# Patient Record
Sex: FEMALE | Race: OTHER | HISPANIC OR LATINO
[De-identification: names, ages, dates, MRNs, and addresses within clinical notes are randomized per-mention and may not be internally consistent; named-entity substitution may affect disease eponyms.]

---

## 2017-02-21 PROBLEM — Z00.00 ENCOUNTER FOR PREVENTIVE HEALTH EXAMINATION: Status: ACTIVE | Noted: 2017-02-21

## 2017-02-24 ENCOUNTER — OTHER (OUTPATIENT)
Age: 27
End: 2017-02-24

## 2017-02-28 ENCOUNTER — ASOB RESULT (OUTPATIENT)
Age: 27
End: 2017-02-28

## 2017-02-28 ENCOUNTER — APPOINTMENT (OUTPATIENT)
Dept: MATERNAL FETAL MEDICINE | Facility: CLINIC | Age: 27
End: 2017-02-28

## 2017-02-28 ENCOUNTER — APPOINTMENT (OUTPATIENT)
Dept: ANTEPARTUM | Facility: CLINIC | Age: 27
End: 2017-02-28

## 2017-02-28 VITALS
BODY MASS INDEX: 29.19 KG/M2 | HEIGHT: 64 IN | WEIGHT: 171 LBS | SYSTOLIC BLOOD PRESSURE: 120 MMHG | DIASTOLIC BLOOD PRESSURE: 70 MMHG

## 2017-03-13 LAB
T3FREE SERPL-MCNC: 2.9 PG/ML
T4 FREE SERPL-MCNC: 1 NG/DL
TSH SERPL-ACNC: 0.6 UIU/ML

## 2017-04-05 LAB
PERFORMED BY: NORMAL
ZIKA VIRUS PANEL RESULT: ABNORMAL

## 2017-04-12 ENCOUNTER — ASOB RESULT (OUTPATIENT)
Age: 27
End: 2017-04-12

## 2017-04-12 ENCOUNTER — APPOINTMENT (OUTPATIENT)
Dept: ANTEPARTUM | Facility: CLINIC | Age: 27
End: 2017-04-12

## 2017-04-12 ENCOUNTER — APPOINTMENT (OUTPATIENT)
Dept: MATERNAL FETAL MEDICINE | Facility: CLINIC | Age: 27
End: 2017-04-12

## 2017-04-12 VITALS
DIASTOLIC BLOOD PRESSURE: 68 MMHG | WEIGHT: 176 LBS | BODY MASS INDEX: 30.05 KG/M2 | SYSTOLIC BLOOD PRESSURE: 106 MMHG | HEIGHT: 64 IN

## 2017-04-12 VITALS — RESPIRATION RATE: 18 BRPM

## 2017-05-11 ENCOUNTER — ASOB RESULT (OUTPATIENT)
Age: 27
End: 2017-05-11

## 2017-05-11 ENCOUNTER — APPOINTMENT (OUTPATIENT)
Dept: ANTEPARTUM | Facility: CLINIC | Age: 27
End: 2017-05-11

## 2017-05-31 ENCOUNTER — APPOINTMENT (OUTPATIENT)
Dept: ANTEPARTUM | Facility: CLINIC | Age: 27
End: 2017-05-31

## 2017-06-11 ENCOUNTER — INPATIENT (INPATIENT)
Facility: HOSPITAL | Age: 27
LOS: 2 days | Discharge: HOME | End: 2017-06-14
Attending: SPECIALIST | Admitting: SPECIALIST
Payer: MEDICAID

## 2017-06-11 VITALS — WEIGHT: 194.01 LBS | HEIGHT: 64 IN

## 2017-06-11 DIAGNOSIS — O47.1 FALSE LABOR AT OR AFTER 37 COMPLETED WEEKS OF GESTATION: ICD-10-CM

## 2017-06-11 LAB
ABO RH CONFIRMATION: SIGNIFICANT CHANGE UP
APPEARANCE UR: CLEAR — SIGNIFICANT CHANGE UP
BACTERIA # UR AUTO: ABNORMAL
BASOPHILS # BLD AUTO: 0 K/UL — SIGNIFICANT CHANGE UP (ref 0–0.2)
BASOPHILS NFR BLD AUTO: 0.1 % — SIGNIFICANT CHANGE UP (ref 0–2)
BILIRUB UR-MCNC: NEGATIVE — SIGNIFICANT CHANGE UP
BLD GP AB SCN SERPL QL: SIGNIFICANT CHANGE UP
COLOR SPEC: YELLOW — SIGNIFICANT CHANGE UP
DIFF PNL FLD: ABNORMAL
EOSINOPHIL # BLD AUTO: 0 K/UL — SIGNIFICANT CHANGE UP (ref 0–0.5)
EOSINOPHIL NFR BLD AUTO: 0.3 % — SIGNIFICANT CHANGE UP (ref 0–6)
EPI CELLS # UR: SIGNIFICANT CHANGE UP
GLUCOSE UR QL: NEGATIVE MG/DL — SIGNIFICANT CHANGE UP
HCT VFR BLD CALC: 35.3 % — LOW (ref 37–47)
HGB BLD-MCNC: 11.4 G/DL — LOW (ref 12–16)
KETONES UR-MCNC: NEGATIVE — SIGNIFICANT CHANGE UP
LEUKOCYTE ESTERASE UR-ACNC: NEGATIVE — SIGNIFICANT CHANGE UP
LYMPHOCYTES # BLD AUTO: 17.7 % — LOW (ref 20–55)
LYMPHOCYTES # BLD AUTO: 2.7 K/UL — SIGNIFICANT CHANGE UP (ref 1–4.8)
MCHC RBC-ENTMCNC: 27.8 PG — SIGNIFICANT CHANGE UP (ref 27–31)
MCHC RBC-ENTMCNC: 32.3 G/DL — SIGNIFICANT CHANGE UP (ref 32–36)
MCV RBC AUTO: 86.1 FL — SIGNIFICANT CHANGE UP (ref 81–99)
MONOCYTES # BLD AUTO: 1 K/UL — HIGH (ref 0–0.8)
MONOCYTES NFR BLD AUTO: 6.8 % — SIGNIFICANT CHANGE UP (ref 3–10)
NEUTROPHILS # BLD AUTO: 11.2 K/UL — HIGH (ref 1.8–8)
NEUTROPHILS NFR BLD AUTO: 74.4 % — HIGH (ref 37–73)
NITRITE UR-MCNC: NEGATIVE — SIGNIFICANT CHANGE UP
PH UR: 6 — SIGNIFICANT CHANGE UP (ref 5–8)
PLATELET # BLD AUTO: 402 K/UL — HIGH (ref 150–400)
PROT UR-MCNC: 30 MG/DL
RBC # BLD: 4.1 M/UL — LOW (ref 4.4–5.2)
RBC # FLD: 14.8 % — SIGNIFICANT CHANGE UP (ref 11–15.6)
RBC CASTS # UR COMP ASSIST: SIGNIFICANT CHANGE UP /HPF (ref 0–4)
SP GR SPEC: 1.01 — SIGNIFICANT CHANGE UP (ref 1.01–1.02)
TYPE + AB SCN PNL BLD: SIGNIFICANT CHANGE UP
UROBILINOGEN FLD QL: NEGATIVE MG/DL — SIGNIFICANT CHANGE UP
WBC # BLD: 15 K/UL — HIGH (ref 4.8–10.8)
WBC # FLD AUTO: 15 K/UL — HIGH (ref 4.8–10.8)
WBC UR QL: NEGATIVE — SIGNIFICANT CHANGE UP

## 2017-06-11 RX ORDER — CITRIC ACID/SODIUM CITRATE 300-500 MG
30 SOLUTION, ORAL ORAL ONCE
Qty: 0 | Refills: 0 | Status: COMPLETED | OUTPATIENT
Start: 2017-06-11 | End: 2017-06-11

## 2017-06-11 RX ORDER — SODIUM CHLORIDE 9 MG/ML
1000 INJECTION, SOLUTION INTRAVENOUS ONCE
Qty: 0 | Refills: 0 | Status: DISCONTINUED | OUTPATIENT
Start: 2017-06-11 | End: 2017-06-12

## 2017-06-11 RX ORDER — PENICILLIN G POTASSIUM 5000000 [IU]/1
2.5 POWDER, FOR SOLUTION INTRAMUSCULAR; INTRAPLEURAL; INTRATHECAL; INTRAVENOUS EVERY 4 HOURS
Qty: 0 | Refills: 0 | Status: COMPLETED | OUTPATIENT
Start: 2017-06-11 | End: 2017-06-11

## 2017-06-11 RX ORDER — SODIUM CHLORIDE 9 MG/ML
500 INJECTION, SOLUTION INTRAVENOUS ONCE
Qty: 0 | Refills: 0 | Status: COMPLETED | OUTPATIENT
Start: 2017-06-11 | End: 2017-06-11

## 2017-06-11 RX ORDER — SODIUM CHLORIDE 9 MG/ML
1000 INJECTION, SOLUTION INTRAVENOUS
Qty: 0 | Refills: 0 | Status: DISCONTINUED | OUTPATIENT
Start: 2017-06-11 | End: 2017-06-12

## 2017-06-11 RX ORDER — SODIUM CHLORIDE 9 MG/ML
1000 INJECTION, SOLUTION INTRAVENOUS ONCE
Qty: 0 | Refills: 0 | Status: COMPLETED | OUTPATIENT
Start: 2017-06-11 | End: 2017-06-11

## 2017-06-11 RX ORDER — PENICILLIN G POTASSIUM 5000000 [IU]/1
5 POWDER, FOR SOLUTION INTRAMUSCULAR; INTRAPLEURAL; INTRATHECAL; INTRAVENOUS ONCE
Qty: 0 | Refills: 0 | Status: COMPLETED | OUTPATIENT
Start: 2017-06-11 | End: 2017-06-11

## 2017-06-11 RX ORDER — SODIUM CHLORIDE 9 MG/ML
1000 INJECTION, SOLUTION INTRAVENOUS
Qty: 0 | Refills: 0 | Status: DISCONTINUED | OUTPATIENT
Start: 2017-06-11 | End: 2017-06-14

## 2017-06-11 RX ORDER — CITRIC ACID/SODIUM CITRATE 300-500 MG
15 SOLUTION, ORAL ORAL ONCE
Qty: 0 | Refills: 0 | Status: DISCONTINUED | OUTPATIENT
Start: 2017-06-11 | End: 2017-06-12

## 2017-06-11 RX ORDER — OXYTOCIN 10 UNIT/ML
2 VIAL (ML) INJECTION
Qty: 30 | Refills: 0 | Status: DISCONTINUED | OUTPATIENT
Start: 2017-06-11 | End: 2017-06-14

## 2017-06-11 RX ORDER — OXYTOCIN 10 UNIT/ML
333.33 VIAL (ML) INJECTION
Qty: 20 | Refills: 0 | Status: COMPLETED | OUTPATIENT
Start: 2017-06-11

## 2017-06-11 RX ORDER — OXYTOCIN 10 UNIT/ML
333.33 VIAL (ML) INJECTION
Qty: 20 | Refills: 0 | Status: DISCONTINUED | OUTPATIENT
Start: 2017-06-11 | End: 2017-06-12

## 2017-06-11 RX ADMIN — SODIUM CHLORIDE 2000 MILLILITER(S): 9 INJECTION, SOLUTION INTRAVENOUS at 21:12

## 2017-06-11 RX ADMIN — Medication 30 MILLILITER(S): at 21:12

## 2017-06-11 RX ADMIN — Medication 2 MILLIUNIT(S)/MIN: at 23:44

## 2017-06-11 RX ADMIN — SODIUM CHLORIDE 1000 MILLILITER(S): 9 INJECTION, SOLUTION INTRAVENOUS at 18:12

## 2017-06-11 RX ADMIN — SODIUM CHLORIDE 125 MILLILITER(S): 9 INJECTION, SOLUTION INTRAVENOUS at 18:35

## 2017-06-11 RX ADMIN — PENICILLIN G POTASSIUM 100 MILLION UNIT(S): 5000000 POWDER, FOR SOLUTION INTRAMUSCULAR; INTRAPLEURAL; INTRATHECAL; INTRAVENOUS at 22:40

## 2017-06-11 RX ADMIN — PENICILLIN G POTASSIUM 100 MILLION UNIT(S): 5000000 POWDER, FOR SOLUTION INTRAMUSCULAR; INTRAPLEURAL; INTRATHECAL; INTRAVENOUS at 18:39

## 2017-06-12 LAB
BASE EXCESS BLDCOA CALC-SCNC: -3.6 MMOL/L — SIGNIFICANT CHANGE UP (ref -11.6–0.4)
BASE EXCESS BLDCOV CALC-SCNC: -2.5 MMOL/L — SIGNIFICANT CHANGE UP (ref -9.3–0.3)
GAS PNL BLDCOV: 7.3 — SIGNIFICANT CHANGE UP (ref 7.11–7.36)
HCO3 BLDCOA-SCNC: 24 MMOL/L — SIGNIFICANT CHANGE UP (ref 15–27)
HCO3 BLDCOV-SCNC: 24 MMOL/L — SIGNIFICANT CHANGE UP (ref 17–25)
PCO2 BLDCOA: 54.2 MMHG — SIGNIFICANT CHANGE UP (ref 32.2–65.8)
PCO2 BLDCOV: 49.8 MMHG — HIGH (ref 27–49.4)
PH BLDCOA: 7.26 — SIGNIFICANT CHANGE UP (ref 7.11–7.36)
PO2 BLDCOA: 24 MMHG — SIGNIFICANT CHANGE UP (ref 17.4–41)
PO2 BLDCOA: 24 MMHG — SIGNIFICANT CHANGE UP (ref 6–30)
RPR SERPL-ACNC: SIGNIFICANT CHANGE UP
SAO2 % BLDCOA: SIGNIFICANT CHANGE UP
SAO2 % BLDCOV: SIGNIFICANT CHANGE UP

## 2017-06-12 RX ORDER — TETANUS TOXOID, REDUCED DIPHTHERIA TOXOID AND ACELLULAR PERTUSSIS VACCINE, ADSORBED 5; 2.5; 8; 8; 2.5 [IU]/.5ML; [IU]/.5ML; UG/.5ML; UG/.5ML; UG/.5ML
0.5 SUSPENSION INTRAMUSCULAR ONCE
Qty: 0 | Refills: 0 | Status: DISCONTINUED | OUTPATIENT
Start: 2017-06-12 | End: 2017-06-14

## 2017-06-12 RX ORDER — OXYTOCIN 10 UNIT/ML
333.33 VIAL (ML) INJECTION
Qty: 20 | Refills: 0 | Status: DISCONTINUED | OUTPATIENT
Start: 2017-06-12 | End: 2017-06-14

## 2017-06-12 RX ORDER — AER TRAVELER 0.5 G/1
1 SOLUTION RECTAL; TOPICAL EVERY 4 HOURS
Qty: 0 | Refills: 0 | Status: DISCONTINUED | OUTPATIENT
Start: 2017-06-12 | End: 2017-06-14

## 2017-06-12 RX ORDER — DIBUCAINE 1 %
1 OINTMENT (GRAM) RECTAL EVERY 4 HOURS
Qty: 0 | Refills: 0 | Status: DISCONTINUED | OUTPATIENT
Start: 2017-06-12 | End: 2017-06-14

## 2017-06-12 RX ORDER — HYDROCORTISONE 1 %
1 OINTMENT (GRAM) TOPICAL EVERY 4 HOURS
Qty: 0 | Refills: 0 | Status: DISCONTINUED | OUTPATIENT
Start: 2017-06-12 | End: 2017-06-14

## 2017-06-12 RX ORDER — AER TRAVELER 0.5 G/1
1 SOLUTION RECTAL; TOPICAL EVERY 4 HOURS
Qty: 0 | Refills: 0 | Status: DISCONTINUED | OUTPATIENT
Start: 2017-06-12 | End: 2017-06-12

## 2017-06-12 RX ORDER — DOCUSATE SODIUM 100 MG
100 CAPSULE ORAL
Qty: 0 | Refills: 0 | Status: DISCONTINUED | OUTPATIENT
Start: 2017-06-12 | End: 2017-06-14

## 2017-06-12 RX ORDER — MAGNESIUM HYDROXIDE 400 MG/1
30 TABLET, CHEWABLE ORAL
Qty: 0 | Refills: 0 | Status: DISCONTINUED | OUTPATIENT
Start: 2017-06-12 | End: 2017-06-14

## 2017-06-12 RX ORDER — ACETAMINOPHEN 500 MG
650 TABLET ORAL EVERY 6 HOURS
Qty: 0 | Refills: 0 | Status: DISCONTINUED | OUTPATIENT
Start: 2017-06-12 | End: 2017-06-14

## 2017-06-12 RX ORDER — IBUPROFEN 200 MG
600 TABLET ORAL EVERY 6 HOURS
Qty: 0 | Refills: 0 | Status: DISCONTINUED | OUTPATIENT
Start: 2017-06-12 | End: 2017-06-14

## 2017-06-12 RX ORDER — OXYTOCIN 10 UNIT/ML
41.67 VIAL (ML) INJECTION
Qty: 20 | Refills: 0 | Status: DISCONTINUED | OUTPATIENT
Start: 2017-06-12 | End: 2017-06-12

## 2017-06-12 RX ORDER — GLYCERIN ADULT
1 SUPPOSITORY, RECTAL RECTAL AT BEDTIME
Qty: 0 | Refills: 0 | Status: DISCONTINUED | OUTPATIENT
Start: 2017-06-12 | End: 2017-06-14

## 2017-06-12 RX ORDER — OXYTOCIN 10 UNIT/ML
41.67 VIAL (ML) INJECTION
Qty: 20 | Refills: 0 | Status: DISCONTINUED | OUTPATIENT
Start: 2017-06-12 | End: 2017-06-14

## 2017-06-12 RX ORDER — SIMETHICONE 80 MG/1
80 TABLET, CHEWABLE ORAL EVERY 6 HOURS
Qty: 0 | Refills: 0 | Status: DISCONTINUED | OUTPATIENT
Start: 2017-06-12 | End: 2017-06-14

## 2017-06-12 RX ORDER — DIPHENHYDRAMINE HCL 50 MG
25 CAPSULE ORAL EVERY 6 HOURS
Qty: 0 | Refills: 0 | Status: DISCONTINUED | OUTPATIENT
Start: 2017-06-12 | End: 2017-06-14

## 2017-06-12 RX ORDER — ACETAMINOPHEN 500 MG
650 TABLET ORAL EVERY 6 HOURS
Qty: 0 | Refills: 0 | Status: DISCONTINUED | OUTPATIENT
Start: 2017-06-12 | End: 2017-06-12

## 2017-06-12 RX ORDER — IBUPROFEN 200 MG
600 TABLET ORAL EVERY 6 HOURS
Qty: 0 | Refills: 0 | Status: DISCONTINUED | OUTPATIENT
Start: 2017-06-12 | End: 2017-06-12

## 2017-06-12 RX ORDER — SODIUM CHLORIDE 9 MG/ML
3 INJECTION INTRAMUSCULAR; INTRAVENOUS; SUBCUTANEOUS EVERY 8 HOURS
Qty: 0 | Refills: 0 | Status: DISCONTINUED | OUTPATIENT
Start: 2017-06-12 | End: 2017-06-12

## 2017-06-12 RX ORDER — PRAMOXINE HYDROCHLORIDE 150 MG/15G
1 AEROSOL, FOAM RECTAL EVERY 4 HOURS
Qty: 0 | Refills: 0 | Status: DISCONTINUED | OUTPATIENT
Start: 2017-06-12 | End: 2017-06-14

## 2017-06-12 RX ORDER — SODIUM CHLORIDE 9 MG/ML
3 INJECTION INTRAMUSCULAR; INTRAVENOUS; SUBCUTANEOUS EVERY 8 HOURS
Qty: 0 | Refills: 0 | Status: DISCONTINUED | OUTPATIENT
Start: 2017-06-12 | End: 2017-06-14

## 2017-06-12 RX ORDER — LANOLIN
1 OINTMENT (GRAM) TOPICAL EVERY 6 HOURS
Qty: 0 | Refills: 0 | Status: DISCONTINUED | OUTPATIENT
Start: 2017-06-12 | End: 2017-06-14

## 2017-06-12 RX ORDER — HYDROCORTISONE 1 %
1 OINTMENT (GRAM) TOPICAL EVERY 4 HOURS
Qty: 0 | Refills: 0 | Status: DISCONTINUED | OUTPATIENT
Start: 2017-06-12 | End: 2017-06-12

## 2017-06-12 RX ORDER — PRAMOXINE HYDROCHLORIDE 150 MG/15G
1 AEROSOL, FOAM RECTAL EVERY 4 HOURS
Qty: 0 | Refills: 0 | Status: DISCONTINUED | OUTPATIENT
Start: 2017-06-12 | End: 2017-06-12

## 2017-06-12 RX ORDER — DIBUCAINE 1 %
1 OINTMENT (GRAM) RECTAL EVERY 4 HOURS
Qty: 0 | Refills: 0 | Status: DISCONTINUED | OUTPATIENT
Start: 2017-06-12 | End: 2017-06-12

## 2017-06-12 RX ADMIN — Medication 600 MILLIGRAM(S): at 03:51

## 2017-06-12 RX ADMIN — Medication 1000 MILLIUNIT(S)/MIN: at 02:05

## 2017-06-12 RX ADMIN — Medication 125 MILLIUNIT(S)/MIN: at 02:45

## 2017-06-12 RX ADMIN — Medication 600 MILLIGRAM(S): at 04:51

## 2017-06-12 RX ADMIN — Medication 600 MILLIGRAM(S): at 01:20

## 2017-06-12 RX ADMIN — Medication 600 MILLIGRAM(S): at 19:00

## 2017-06-12 RX ADMIN — Medication 600 MILLIGRAM(S): at 18:21

## 2017-06-12 RX ADMIN — Medication 600 MILLIGRAM(S): at 11:36

## 2017-06-12 RX ADMIN — Medication 1 TABLET(S): at 11:24

## 2017-06-12 NOTE — DISCHARGE NOTE OB - MATERIALS PROVIDED
[facility]  Screening Program/Breastfeeding Mother’s Support Group Information/Guide to Postpartum Care/[facility] Hearing Screen Program/Shaken Baby Prevention Handout/Birth Certificate Instructions/MMR Vaccination (VIS Pub Date: 2012)/  Immunization Record/Breastfeeding Log/Breastfeeding Guide and Packet/Back To Sleep Handout/Discharge Medication Information for Patients and Families Pocket Guide/Letter of Medical Neccessity/Vaccinations/Tdap Vaccination (VIS Pub Date: 2012)

## 2017-06-12 NOTE — DISCHARGE NOTE OB - PATIENT PORTAL LINK FT
“You can access the FollowHealth Patient Portal, offered by [facility], by registering with the following website: [email or web address]”

## 2017-06-12 NOTE — DISCHARGE NOTE OB - CARE PLAN
Principal Discharge DX:	Vaginal delivery  Goal:	return to normal activity  Instructions for follow-up, activity and diet:	as tolerated

## 2017-06-12 NOTE — DISCHARGE NOTE OB - MEDICATION SUMMARY - MEDICATIONS TO TAKE
I will START or STAY ON the medications listed below when I get home from the hospital:     mg oral tablet  -- 1 tab(s) by mouth every 6 hours, As needed, Moderate Pain MDD:4  -- Indication: For prn pain

## 2017-06-12 NOTE — DISCHARGE NOTE OB - CARE PROVIDER_API CALL
Clover Nunez), Obstetrics and Gynecology  [address] [place] [address]  Phone: [phone]  Fax: [phone]

## 2017-06-13 LAB
BASOPHILS # BLD AUTO: 0 K/UL — SIGNIFICANT CHANGE UP (ref 0–0.2)
BASOPHILS NFR BLD AUTO: 0.2 % — SIGNIFICANT CHANGE UP (ref 0–2)
EOSINOPHIL # BLD AUTO: 0.2 K/UL — SIGNIFICANT CHANGE UP (ref 0–0.5)
EOSINOPHIL NFR BLD AUTO: 1.7 % — SIGNIFICANT CHANGE UP (ref 0–6)
HCT VFR BLD CALC: 29.2 % — LOW (ref 37–47)
HGB BLD-MCNC: 9.6 G/DL — LOW (ref 12–16)
LYMPHOCYTES # BLD AUTO: 30.9 % — SIGNIFICANT CHANGE UP (ref 20–55)
LYMPHOCYTES # BLD AUTO: 4 K/UL — SIGNIFICANT CHANGE UP (ref 1–4.8)
MCHC RBC-ENTMCNC: 28.2 PG — SIGNIFICANT CHANGE UP (ref 27–31)
MCHC RBC-ENTMCNC: 32.9 G/DL — SIGNIFICANT CHANGE UP (ref 32–36)
MCV RBC AUTO: 85.6 FL — SIGNIFICANT CHANGE UP (ref 81–99)
MONOCYTES # BLD AUTO: 0.8 K/UL — SIGNIFICANT CHANGE UP (ref 0–0.8)
MONOCYTES NFR BLD AUTO: 6.6 % — SIGNIFICANT CHANGE UP (ref 3–10)
NEUTROPHILS # BLD AUTO: 7.6 K/UL — SIGNIFICANT CHANGE UP (ref 1.8–8)
NEUTROPHILS NFR BLD AUTO: 59.4 % — SIGNIFICANT CHANGE UP (ref 37–73)
PLATELET # BLD AUTO: 343 K/UL — SIGNIFICANT CHANGE UP (ref 150–400)
RBC # BLD: 3.41 M/UL — LOW (ref 4.4–5.2)
RBC # FLD: 15.3 % — SIGNIFICANT CHANGE UP (ref 11–15.6)
WBC # BLD: 12.9 K/UL — HIGH (ref 4.8–10.8)
WBC # FLD AUTO: 12.9 K/UL — HIGH (ref 4.8–10.8)

## 2017-06-13 RX ADMIN — Medication 600 MILLIGRAM(S): at 20:17

## 2017-06-13 RX ADMIN — Medication 1 TABLET(S): at 12:08

## 2017-06-13 RX ADMIN — Medication 600 MILLIGRAM(S): at 21:15

## 2017-06-13 NOTE — PROGRESS NOTE ADULT - SUBJECTIVE AND OBJECTIVE BOX
S: Patient doing well. Minimal lochia. No complaints. S/P  , Male.    O: Vital Signs Last 24 Hrs  T(C): 37, Max: 37.4 (06-12 @ 21:48)  T(F): 98.6, Max: 99.4 (06-12 @ 21:48)  HR: 73 (73 - 79)  BP: 117/73 (117/73 - 129/79)  BP(mean): --  RR: 18 (18 - 18)  SpO2: --    Gen: NAD  Abd: soft, NT, ND, fundus firm below umbilicus  Ext: no tenderness    Labs:                        9.6    12.9  )-----------( 343      ( 2017 06:37 )             29.2        Rubella status:    A: 27y PPD#1  s/p      Doing well    Plan:  [ ] Routine post partum care.  [ ] Discharge home in AM.

## 2017-06-14 VITALS
TEMPERATURE: 99 F | SYSTOLIC BLOOD PRESSURE: 119 MMHG | HEART RATE: 88 BPM | DIASTOLIC BLOOD PRESSURE: 76 MMHG | RESPIRATION RATE: 18 BRPM

## 2017-06-14 PROCEDURE — 85027 COMPLETE CBC AUTOMATED: CPT

## 2017-06-14 PROCEDURE — 36415 COLL VENOUS BLD VENIPUNCTURE: CPT

## 2017-06-14 PROCEDURE — 81001 URINALYSIS AUTO W/SCOPE: CPT

## 2017-06-14 PROCEDURE — 86592 SYPHILIS TEST NON-TREP QUAL: CPT

## 2017-06-14 PROCEDURE — 86850 RBC ANTIBODY SCREEN: CPT

## 2017-06-14 PROCEDURE — 59025 FETAL NON-STRESS TEST: CPT

## 2017-06-14 PROCEDURE — 59050 FETAL MONITOR W/REPORT: CPT

## 2017-06-14 PROCEDURE — 86900 BLOOD TYPING SEROLOGIC ABO: CPT

## 2017-06-14 PROCEDURE — G0463: CPT

## 2017-06-14 PROCEDURE — T1013: CPT

## 2017-06-14 PROCEDURE — 86901 BLOOD TYPING SEROLOGIC RH(D): CPT

## 2017-06-14 PROCEDURE — 82803 BLOOD GASES ANY COMBINATION: CPT

## 2017-06-14 RX ORDER — IBUPROFEN 200 MG
1 TABLET ORAL
Qty: 40 | Refills: 0 | OUTPATIENT
Start: 2017-06-14

## 2017-06-14 RX ADMIN — Medication 1 TABLET(S): at 11:47

## 2017-06-14 NOTE — PROGRESS NOTE ADULT - SUBJECTIVE AND OBJECTIVE BOX
PP DAY #2      S : Patient doing well. Minimal lochia. No complaints.     O: Vital Signs Last 24 Hrs  T(C): 37.2, Max: 37.2 (-14 @ 09:08)  T(F): 98.9, Max: 98.9 (-14 @ 09:08)  HR: 88 (70 - 88)  BP: 119/76 (119/76 - 128/77)  BP(mean): --  RR: 18 (18 - 18)  SpO2: --    Gen: NAD  Abd: soft, NT, ND, fundus firm below umbilicus  Ext: no tenderness    Labs:                        9.6    12.9  )-----------( 343      ( 2017 06:37 )             29.2        Rubella status:    A: 27y PPD# 2 s/p      Doing well    Plan:  [ ] Discharge home.  Nothing in vagina and no heavy lifting for 6 weeks.   Follow up 6 weeks for post partum visit.  Call office for any fevers, chills, heavy vaginal bleeding, symptoms of depression, or any other concerning symptoms.  Continue motrin 600 mg every 6 hours.

## 2019-04-23 ENCOUNTER — EMERGENCY (EMERGENCY)
Facility: HOSPITAL | Age: 29
LOS: 1 days | End: 2019-04-23
Attending: EMERGENCY MEDICINE
Payer: COMMERCIAL

## 2019-04-23 VITALS
RESPIRATION RATE: 20 BRPM | WEIGHT: 149.91 LBS | TEMPERATURE: 99 F | HEIGHT: 64 IN | SYSTOLIC BLOOD PRESSURE: 119 MMHG | OXYGEN SATURATION: 100 % | HEART RATE: 75 BPM | DIASTOLIC BLOOD PRESSURE: 77 MMHG

## 2019-04-23 LAB
ALBUMIN SERPL ELPH-MCNC: 4.9 G/DL — SIGNIFICANT CHANGE UP (ref 3.3–5.2)
ALP SERPL-CCNC: 86 U/L — SIGNIFICANT CHANGE UP (ref 40–120)
ALT FLD-CCNC: 11 U/L — SIGNIFICANT CHANGE UP
ANION GAP SERPL CALC-SCNC: 12 MMOL/L — SIGNIFICANT CHANGE UP (ref 5–17)
APPEARANCE UR: CLEAR — SIGNIFICANT CHANGE UP
AST SERPL-CCNC: 15 U/L — SIGNIFICANT CHANGE UP
BACTERIA # UR AUTO: ABNORMAL
BASOPHILS # BLD AUTO: 0 K/UL — SIGNIFICANT CHANGE UP (ref 0–0.2)
BASOPHILS NFR BLD AUTO: 0.2 % — SIGNIFICANT CHANGE UP (ref 0–2)
BILIRUB SERPL-MCNC: 0.7 MG/DL — SIGNIFICANT CHANGE UP (ref 0.4–2)
BILIRUB UR-MCNC: NEGATIVE — SIGNIFICANT CHANGE UP
BUN SERPL-MCNC: 9 MG/DL — SIGNIFICANT CHANGE UP (ref 8–20)
CALCIUM SERPL-MCNC: 9.2 MG/DL — SIGNIFICANT CHANGE UP (ref 8.6–10.2)
CHLORIDE SERPL-SCNC: 103 MMOL/L — SIGNIFICANT CHANGE UP (ref 98–107)
CO2 SERPL-SCNC: 24 MMOL/L — SIGNIFICANT CHANGE UP (ref 22–29)
COLOR SPEC: YELLOW — SIGNIFICANT CHANGE UP
CREAT SERPL-MCNC: 0.37 MG/DL — LOW (ref 0.5–1.3)
DIFF PNL FLD: ABNORMAL
EOSINOPHIL # BLD AUTO: 0.2 K/UL — SIGNIFICANT CHANGE UP (ref 0–0.5)
EOSINOPHIL NFR BLD AUTO: 1.9 % — SIGNIFICANT CHANGE UP (ref 0–6)
EPI CELLS # UR: SIGNIFICANT CHANGE UP
GLUCOSE SERPL-MCNC: 85 MG/DL — SIGNIFICANT CHANGE UP (ref 70–115)
GLUCOSE UR QL: NEGATIVE MG/DL — SIGNIFICANT CHANGE UP
HCG SERPL-ACNC: 1736 MIU/ML — HIGH
HCT VFR BLD CALC: 40.8 % — SIGNIFICANT CHANGE UP (ref 37–47)
HGB BLD-MCNC: 13.7 G/DL — SIGNIFICANT CHANGE UP (ref 12–16)
KETONES UR-MCNC: NEGATIVE — SIGNIFICANT CHANGE UP
LEUKOCYTE ESTERASE UR-ACNC: ABNORMAL
LYMPHOCYTES # BLD AUTO: 2.8 K/UL — SIGNIFICANT CHANGE UP (ref 1–4.8)
LYMPHOCYTES # BLD AUTO: 22.3 % — SIGNIFICANT CHANGE UP (ref 20–55)
MCHC RBC-ENTMCNC: 30.4 PG — SIGNIFICANT CHANGE UP (ref 27–31)
MCHC RBC-ENTMCNC: 33.6 G/DL — SIGNIFICANT CHANGE UP (ref 32–36)
MCV RBC AUTO: 90.5 FL — SIGNIFICANT CHANGE UP (ref 81–99)
MONOCYTES # BLD AUTO: 0.5 K/UL — SIGNIFICANT CHANGE UP (ref 0–0.8)
MONOCYTES NFR BLD AUTO: 3.9 % — SIGNIFICANT CHANGE UP (ref 3–10)
NEUTROPHILS # BLD AUTO: 9.1 K/UL — HIGH (ref 1.8–8)
NEUTROPHILS NFR BLD AUTO: 71.4 % — SIGNIFICANT CHANGE UP (ref 37–73)
NITRITE UR-MCNC: NEGATIVE — SIGNIFICANT CHANGE UP
PH UR: 8 — SIGNIFICANT CHANGE UP (ref 5–8)
PLATELET # BLD AUTO: 376 K/UL — SIGNIFICANT CHANGE UP (ref 150–400)
POTASSIUM SERPL-MCNC: 3.5 MMOL/L — SIGNIFICANT CHANGE UP (ref 3.5–5.3)
POTASSIUM SERPL-SCNC: 3.5 MMOL/L — SIGNIFICANT CHANGE UP (ref 3.5–5.3)
PROT SERPL-MCNC: 8.1 G/DL — SIGNIFICANT CHANGE UP (ref 6.6–8.7)
PROT UR-MCNC: 30 MG/DL
RBC # BLD: 4.51 M/UL — SIGNIFICANT CHANGE UP (ref 4.4–5.2)
RBC # FLD: 12.3 % — SIGNIFICANT CHANGE UP (ref 11–15.6)
RBC CASTS # UR COMP ASSIST: ABNORMAL /HPF (ref 0–4)
SODIUM SERPL-SCNC: 139 MMOL/L — SIGNIFICANT CHANGE UP (ref 135–145)
SP GR SPEC: 1.02 — SIGNIFICANT CHANGE UP (ref 1.01–1.02)
UROBILINOGEN FLD QL: NEGATIVE MG/DL — SIGNIFICANT CHANGE UP
WBC # BLD: 12.7 K/UL — HIGH (ref 4.8–10.8)
WBC # FLD AUTO: 12.7 K/UL — HIGH (ref 4.8–10.8)
WBC UR QL: SIGNIFICANT CHANGE UP

## 2019-04-23 PROCEDURE — 80053 COMPREHEN METABOLIC PANEL: CPT

## 2019-04-23 PROCEDURE — 86850 RBC ANTIBODY SCREEN: CPT

## 2019-04-23 PROCEDURE — 36415 COLL VENOUS BLD VENIPUNCTURE: CPT

## 2019-04-23 PROCEDURE — 99284 EMERGENCY DEPT VISIT MOD MDM: CPT

## 2019-04-23 PROCEDURE — T1013: CPT

## 2019-04-23 PROCEDURE — 81001 URINALYSIS AUTO W/SCOPE: CPT

## 2019-04-23 PROCEDURE — 76817 TRANSVAGINAL US OBSTETRIC: CPT

## 2019-04-23 PROCEDURE — 85027 COMPLETE CBC AUTOMATED: CPT

## 2019-04-23 PROCEDURE — 86900 BLOOD TYPING SEROLOGIC ABO: CPT

## 2019-04-23 PROCEDURE — 84702 CHORIONIC GONADOTROPIN TEST: CPT

## 2019-04-23 PROCEDURE — 76801 OB US < 14 WKS SINGLE FETUS: CPT | Mod: 26

## 2019-04-23 PROCEDURE — 86901 BLOOD TYPING SEROLOGIC RH(D): CPT

## 2019-04-23 PROCEDURE — 76801 OB US < 14 WKS SINGLE FETUS: CPT

## 2019-04-23 PROCEDURE — 76817 TRANSVAGINAL US OBSTETRIC: CPT | Mod: 26

## 2019-04-23 RX ORDER — ACETAMINOPHEN 500 MG
650 TABLET ORAL ONCE
Qty: 0 | Refills: 0 | Status: COMPLETED | OUTPATIENT
Start: 2019-04-23 | End: 2019-04-23

## 2019-04-23 RX ADMIN — Medication 650 MILLIGRAM(S): at 19:12

## 2019-04-23 NOTE — ED STATDOCS - CARE PLAN
Principal Discharge DX:	Abdominal pain in pregnancy, first trimester  Secondary Diagnosis:	Pregnancy of unknown anatomic location

## 2019-04-23 NOTE — ED ADULT NURSE NOTE - OBJECTIVE STATEMENT
pt came to the ED for c/o vaginal bleed that started today and pt also stated that is 7 weeks pregnant Stared today.   pt also c/o of abd pain.  pt is A/Ox3.

## 2019-04-23 NOTE — ED STATDOCS - PROGRESS NOTE DETAILS
no IUP - case discussed with OB resident who will see the patient PA Note: sign out received from VALERIE Lora. Pt seen by OBGYN, pt to follow up at [facility] clinic to repeat HCG 48hrs.

## 2019-04-23 NOTE — ED ADULT NURSE NOTE - NSIMPLEMENTINTERV_GEN_ALL_ED
Implemented All Universal Safety Interventions:  [place] to call system. Call bell, personal items and telephone within reach. Instruct patient to call for assistance. Room bathroom lighting operational. Non-slip footwear when patient is off stretcher. Physically safe environment: no spills, clutter or unnecessary equipment. Stretcher in lowest position, wheels locked, appropriate side rails in place.

## 2019-04-23 NOTE — ED STATDOCS - OBJECTIVE STATEMENT
28 y/o F pt 7 weeks pregnant with PMHx  A1 presents to the ED c/o gradual onset vaginal bleeding.  Per , pt reports vaginal spotting beginning this morning, gradually worsening throughout the day.  She also reports associated suprapubic pain.  Pt has not yet seen a GYN for this pregnancy.  Denies dysuria, fever, chills, CP, SOB, N/V.  No further acute complaints at this time.  GYN: [facility]

## 2019-04-23 NOTE — ED STATDOCS - CLINICAL SUMMARY MEDICAL DECISION MAKING FREE TEXT BOX
labs, urine, US, eval for vaginal bleeding in pregnancy Pt seen by gyn, cleared for dc and repeated beta in 48 hours, wellm appearing, abd soft and non-surguical, well appearing

## 2019-04-23 NOTE — ED STATDOCS - ATTENDING CONTRIBUTION TO CARE
I, Roque Lindsey, performed the initial face to face bedside interview with this patient regarding history of present illness, review of symptoms and relevant past medical, social and family history.  I completed an independent physical examination.  I was the initial provider who evaluated this patient. I have signed out the follow up of any pending tests (i.e. labs, radiological studies) to the ACP.  I have communicated the patient’s plan of care and disposition with the ACP.

## 2019-04-24 VITALS
TEMPERATURE: 98 F | HEART RATE: 84 BPM | OXYGEN SATURATION: 100 % | DIASTOLIC BLOOD PRESSURE: 81 MMHG | RESPIRATION RATE: 20 BRPM | SYSTOLIC BLOOD PRESSURE: 118 MMHG

## 2019-04-24 LAB
BLD GP AB SCN SERPL QL: SIGNIFICANT CHANGE UP
TYPE + AB SCN PNL BLD: SIGNIFICANT CHANGE UP

## 2019-04-24 NOTE — CONSULT NOTE ADULT - SUBJECTIVE AND OBJECTIVE BOX
30 yo  at 7 weeks presents with vaginal bleeding and cramping which began today. Patient was seen with her  and son at bedside. She states it started with spotting today and slowly increased and even produced multiple medium sized clots. She used 3 pads today. She states the abd pain is currently mild at 4/10, she did not take any medications to alleviate the pain. She and her  state they were not planning on having another child but would be happy if she was pregnant.    Patient denies any fever, chills, HA, CP, SOB, N/V/D or dysuria.    PObhx: 2017 , no complications; spontaneous  x2 ( and )  PMH: none  PSH: none  Allegies: NKDA  SH: neg x3  FH: noncontributory    Vital Signs  T(F): 98.4 (2019 00:19), Max: 98.7 (2019 17:47)  HR: 84 (2019 00:19) (72 - 84)  BP: 118/81 (2019 00:19) (110/72 - 119/77)  RR: 20 (2019 00:19) (20 - 20)  SpO2: 100% (2019 00:19) (100% - 100%)    PHYSICAL EXAM  Gen: NAD, patient sitting comfortably in her chair.   Abd: flat, nontender to palpation, no rebound tenderness  SSE: small amount of blood in vaginal vault, no clots  SVE: cervix closed, no cervical motion tenderness, no adnexal tenderness    Labs  HCG Quantitative, Serum: 1736.0       CBC Full  -  ( 2019 18:45 )  WBC Count : 12.7 K/uL  RBC Count : 4.51 M/uL  Hemoglobin : 13.7 g/dL  Hematocrit : 40.8 %  Platelet Count - Automated : 376 K/uL  Mean Cell Volume : 90.5 fl  Mean Cell Hemoglobin : 30.4 pg  Mean Cell Hemoglobin Concentration : 33.6 g/dL  Auto Neutrophil # : 9.1 K/uL  Auto Lymphocyte # : 2.8 K/uL  Auto Monocyte # : 0.5 K/uL  Auto Eosinophil # : 0.2 K/uL  Auto Basophil # : 0.0 K/uL  Auto Neutrophil % : 71.4 %  Auto Lymphocyte % : 22.3 %  Auto Monocyte % : 3.9 %  Auto Eosinophil % : 1.9 %  Auto Basophil % : 0.2 %        139  |  103  |  9.0  ----------------------------<  85  3.5   |  24.0  |  0.37<L>    Ca    9.2      2019 18:45    TPro  8.1  /  Alb  4.9  /  TBili  0.7  /  DBili  x   /  AST  15  /  ALT  11  /  AlkPhos  86      Imaging    < from: US Transvaginal, OB (19 @ 20:43) >  FINDINGS:    Uterus: 8.5 x 4.2 x 5.9 m. Probable fibroid measuring 3.0 cm.   Questionable arcuate versus septate uterus. No IUP.       Endometrial stripe measures 14 mm. 1 cm area of fluid in the lower   uterine segment.    Right ovary: 2.4 x 1.9 x 2.4 cm. Within normal limits.    Left ovary: 2.7 x 2.2 x 2.4 cm. Corpus luteal cyst measures 2.0 cm    Fluid: Trace fluid in the posterior pelvic cul-de-sac    IMPRESSION:    No IUP. Pregnancy of undetermined location. Possible arcuate versus   septate uterus. Nonspecific fluid collection in the lower uterine   segment. Follow-up ultrasound is recommended with serial beta hCG   correlation.    < end of copied text >

## 2019-04-24 NOTE — CONSULT NOTE ADULT - ATTENDING COMMENTS
I have seen and examined this pt in the ER with Dr. Conroy. On exam the abdomen is not distended, is NT, and no rebound is present. BS are normal. BUSV small amount if dark bloof. Cx is closed, and no POC are visualized at the os, Uterus is top normal size, NT, mobile. Adnexa are NT bilaterally without masses. Case was fully discussed with the  in attendance. Pt was given ectopic pregnancy precautions, and will F/U 48 hours for rpt QSBHCG and pelvic TV ultrasound.

## 2019-04-24 NOTE — CONSULT NOTE ADULT - ASSESSMENT
29  at 7 weeks presents with vaginal bleeding and abdominal cramps 29  at 7 weeks presents with vaginal bleeding and abdominal cramps, consulted for concern for ectopic pregnancy    Pregnancy of Unknown Location  -Patient hemodynamically stable, physical exam benign  -TVUS shows no IUP or location of ectopic  -Patient may have passed products of conception  -Will need repeat HCG at 48 hours from now and possibly repeat US  -Advised to follow up with [facility] (Dr Carroll's group) or return to ED for repeat if unable to make appointment  -Advised she may expect some vaginal spotting this early in pregnancy  -Advised if she develops increased bleeding or severe abd pain to call her Ob/Gyn or return to the ED    -Discussed with attending Dr Rivers

## 2019-05-31 ENCOUNTER — RECORD ABSTRACTING (OUTPATIENT)
Age: 29
End: 2019-05-31

## 2019-05-31 DIAGNOSIS — Z86.19 PERSONAL HISTORY OF OTHER INFECTIOUS AND PARASITIC DISEASES: ICD-10-CM

## 2019-05-31 DIAGNOSIS — N96 RECURRENT PREGNANCY LOSS: ICD-10-CM

## 2019-05-31 DIAGNOSIS — Z82.49 FAMILY HISTORY OF ISCHEMIC HEART DISEASE AND OTHER DISEASES OF THE CIRCULATORY SYSTEM: ICD-10-CM

## 2019-05-31 DIAGNOSIS — Z83.3 FAMILY HISTORY OF DIABETES MELLITUS: ICD-10-CM

## 2019-05-31 LAB — CYTOLOGY CVX/VAG DOC THIN PREP: NORMAL

## 2019-06-04 ENCOUNTER — APPOINTMENT (OUTPATIENT)
Dept: OBGYN | Facility: CLINIC | Age: 29
End: 2019-06-04
Payer: COMMERCIAL

## 2019-06-04 DIAGNOSIS — O99.283 ENDOCRINE, NUTRITIONAL AND METABOLIC DISEASES COMPLICATING PREGNANCY, THIRD TRIMESTER: ICD-10-CM

## 2019-06-04 DIAGNOSIS — Z20.821 OTHER SPECIFIED DISEASES AND CONDITIONS COMPLICATING PREGNANCY, CHILDBIRTH AND THE PUERPERIUM: ICD-10-CM

## 2019-06-04 DIAGNOSIS — O99.89 OTHER SPECIFIED DISEASES AND CONDITIONS COMPLICATING PREGNANCY, CHILDBIRTH AND THE PUERPERIUM: ICD-10-CM

## 2019-06-04 DIAGNOSIS — E07.9 ENDOCRINE, NUTRITIONAL AND METABOLIC DISEASES COMPLICATING PREGNANCY, THIRD TRIMESTER: ICD-10-CM

## 2019-06-04 LAB
BILIRUB UR QL STRIP: NORMAL
CLARITY UR: CLEAR
COLLECTION METHOD: NORMAL
GLUCOSE UR-MCNC: NORMAL
HCG UR QL: 0.2 EU/DL
HCG UR QL: NEGATIVE
HGB UR QL STRIP.AUTO: ABNORMAL
KETONES UR-MCNC: ABNORMAL
LEUKOCYTE ESTERASE UR QL STRIP: NORMAL
NITRITE UR QL STRIP: NORMAL
PH UR STRIP: 7
PROT UR STRIP-MCNC: 30
QUALITY CONTROL: YES
SP GR UR STRIP: 1.02

## 2019-06-04 PROCEDURE — 81003 URINALYSIS AUTO W/O SCOPE: CPT | Mod: QW

## 2019-06-04 PROCEDURE — 99395 PREV VISIT EST AGE 18-39: CPT

## 2019-06-04 PROCEDURE — 81025 URINE PREGNANCY TEST: CPT

## 2019-06-05 NOTE — PHYSICAL EXAM
[Acute Distress] : no acute distress [Awake] : awake [Alert] : alert [Nipple Discharge] : no nipple discharge [Mass] : no breast mass [Soft] : soft [Axillary LAD] : no axillary lymphadenopathy [Distended] : not distended [Tender] : non tender [Oriented x3] : oriented to person, place, and time [Depressed Mood] : not depressed [Flat Affect] : affect not flat [Normal] : uterus [Labia Majora] : labia major [Labia Minora] : labia minora [Pap Obtained] : a Pap smear was performed [Tenderness] : nontender [Uterine Adnexae] : were not tender and not enlarged

## 2019-06-05 NOTE — DISCUSSION/SUMMARY
[FreeTextEntry1] : Contraceptive options discussed along with the respective effectiveness, risks, benefits, and side effects. The options discussed included expectant management, condom use, OCP's, nuvaring, nexplanon, IUD. Questions were answered. She desires the Mirena IUD. She will check for insurance coverage. She will call with menses and premedicate with motrin.\par \par F/U pap.\par \par

## 2019-06-05 NOTE — REVIEW OF SYSTEMS
[Nl] : Musculoskeletal [Urinary Incontinence] : urinary incontinence [Chills] : no chills [Fever] : no fever [Chest Pain] : no chest pain [Dyspnea] : no shortness of breath [Pelvic Pain] : no pelvic pain [Vomiting] : no vomiting [Abn Vag Bleeding] : no abnormal vaginal bleeding

## 2019-06-05 NOTE — HISTORY OF PRESENT ILLNESS
[___ Year(s) Ago] : [unfilled] year(s) ago [Good] : being in good health [Healthy Diet] : a healthy diet [Regular Exercise] : regular exercise [Last Pap ___] : Last cervical pap smear was [unfilled] [Definite:  ___ (Date)] : the last menstrual period was [unfilled] [Menarche Age: ____] : age at menarche was [unfilled] [Sexually Active] : is sexually active [Male ___] : [unfilled] male [Contraception] : does not use contraception

## 2019-06-07 LAB
C TRACH RRNA SPEC QL NAA+PROBE: NOT DETECTED
HPV HIGH+LOW RISK DNA PNL CVX: NOT DETECTED
N GONORRHOEA RRNA SPEC QL NAA+PROBE: NOT DETECTED
SOURCE TP AMPLIFICATION: NORMAL

## 2019-06-10 LAB — CYTOLOGY CVX/VAG DOC THIN PREP: NORMAL

## 2021-06-15 NOTE — PATIENT PROFILE OB - NS TRANSFER DISPOSITION PATIENT BELONGINGS
Patient: Dino Bundy  COLONOSCOPY  Anesthesia type: MAC    Patient location: Phase II Recovery  Last vitals:   Vitals:    12/27/17 0826   BP: 138/89   Pulse: 88   Resp: 16   Temp:    SpO2: 94%     Post vital signs: stable  Level of consciousness: awake and responds to simple questions  Post-anesthesia pain: pain controlled  Post-anesthesia nausea and vomiting: no  Pulmonary: unassisted, return to baseline  Cardiovascular: stable and blood pressure at baseline  Hydration: adequate  Anesthetic events: no    QCDR Measures:  ASA# 11 - Fernanda-op Cardiac Arrest: ASA11B - Patient did NOT experience unanticipated cardiac arrest  ASA# 12 - Fernanda-op Mortality Rate: ASA12B - Patient did NOT die  ASA# 13 - PACU Re-Intubation Rate: ASA13B - Patient did NOT require a new airway mgmt  ASA# 10 - Composite Anes Safety: ASA10A - No serious adverse event    Additional Notes:   /given to family

## 2021-07-19 NOTE — ED ADULT NURSE NOTE - TEMPLATE LIST FOR HEAD TO TOE ASSESSMENT
Spent 68 minutes discussing care with patient's daughter, Megan.  Explained in detail why BKA/possible AKA was recommended as patient has been treated by ID, wound care and surgery for over 5 years with minimal success.  From the ID perspective, suppressing the Pseudomonas with ciprofloxacin places patient at great risk for tendon rupture, heart arrhythmias and further resistance.  The Pseudomonas has had times in the recent past where it has been resistant to ciprofloxacin and needed IV antibiotic treatment.  Success of surgical debridement and further wound care without the treatment of antibiotics is minimal especially since patient is colonized with Pseudomonas.  Patient's daughter noting that she will likely get a second opinion from all entities, wound care, ID, surgery.  Also recommended evaluation by a dermatologist and she will try to have her father seen once again by a vascular surgeon.  Follow-up with ID is as needed.   OB/GYN

## 2021-08-16 ENCOUNTER — EMERGENCY (EMERGENCY)
Facility: HOSPITAL | Age: 31
LOS: 0 days | Discharge: HOME | End: 2021-08-16
Attending: EMERGENCY MEDICINE
Payer: COMMERCIAL

## 2021-08-16 ENCOUNTER — TRANSCRIPTION ENCOUNTER (OUTPATIENT)
Age: 31
End: 2021-08-16

## 2021-08-16 VITALS — HEIGHT: 64 IN | WEIGHT: 166.89 LBS

## 2021-08-16 VITALS
DIASTOLIC BLOOD PRESSURE: 72 MMHG | OXYGEN SATURATION: 100 % | RESPIRATION RATE: 16 BRPM | TEMPERATURE: 99 F | SYSTOLIC BLOOD PRESSURE: 122 MMHG | HEART RATE: 76 BPM

## 2021-08-16 DIAGNOSIS — R20.2 PARESTHESIA OF SKIN: ICD-10-CM

## 2021-08-16 PROCEDURE — 73030 X-RAY EXAM OF SHOULDER: CPT | Mod: 26,LT

## 2021-08-16 PROCEDURE — 99283 EMERGENCY DEPT VISIT LOW MDM: CPT | Mod: 25

## 2021-08-16 PROCEDURE — 73030 X-RAY EXAM OF SHOULDER: CPT | Mod: LT

## 2021-08-16 PROCEDURE — 99284 EMERGENCY DEPT VISIT MOD MDM: CPT

## 2021-08-16 NOTE — ED ADULT TRIAGE NOTE - CHIEF COMPLAINT QUOTE
pt c/o left arm cramping and tingling beginning around 5a, denies numbness. denies CP, SOB. sent by UC

## 2021-08-16 NOTE — ED STATDOCS - CLINICAL SUMMARY MEDICAL DECISION MAKING FREE TEXT BOX
Patient without focal neuro signs including no weakness, no objective sensory deficit.  I'm not concerned of stroke in the patient.  C/o right shoulder pain, paresthesias of left arm.  XR appears WNL.  No cervical spine TTP, normal ROM of neck.  Vascularly intact.  Question radiculopathy/impinged nerve as cause as has focal shoulder TTP.  D/c home with neurology evaluation to r/o other cause.

## 2021-08-16 NOTE — ED STATDOCS - OBJECTIVE STATEMENT
30 y/o female no pmhx, presents with L arm pain and paresthesias this morning after waking up around 5am. Pt c/o cramps to L arm that travel from shoulder down and paresthesias. Denies trauma. Denies weakness of arm. Denies any sensation change in the arm. Denies any other sx. No medications taken PTA.  provides interpretation

## 2021-08-16 NOTE — ED STATDOCS - PATIENT PORTAL LINK FT
You can access the FollowMyHealth Patient Portal offered by [facility] by registering at the following website: [email or web address]. By joining [employer]’s FollowMyHealth portal, you will also be able to view your health information using other applications (apps) compatible with our system.

## 2021-08-16 NOTE — ED STATDOCS - CARE PROVIDER_API CALL
Valentina Monsivais  NEUROLOGY - GENERAL  [address] [place] [address], [place] [address]  Phone: [phone]  Fax: [phone]  Follow Up Time:

## 2021-08-16 NOTE — ED STATDOCS - NS_EDPROVIDERDISPOUSERTYPE_ED_A_ED
Med rec completed per pt at bedside  Allergies reviewed and updated - pt states NKDA  No ABX in last 30 days      Scribe Attestation (For Scribes USE Only)... Attending Attestation (For Attendings USE Only).../Scribe Attestation (For Scribes USE Only)...

## 2021-08-16 NOTE — ED STATDOCS - NSFOLLOWUPINSTRUCTIONS_ED_ALL_ED_FT
Paresthesia    WHAT YOU NEED TO KNOW:    Paresthesia is numbness, tingling, or burning. It can happen in any part of your body, but usually occurs in your legs, feet, arms, or hands.    DISCHARGE INSTRUCTIONS:    Return to the emergency department if:   •You have severe pain along with numbness and tingling.      •Your legs suddenly become cold. You have trouble moving your legs, and they ache.      •You have increased weakness in a part of your body.      •You have uncontrolled movements.      Contact your healthcare provider or neurologist if:   •Your symptoms do not improve.      •You have symptoms in more than one part of your body.      •You have questions or concerns about your condition or care.      Manage paresthesia:   •Protect the area from injury. You may injure or burn yourself if you lose feeling in the area. Be careful when you touch anything that could be hot. Wear sturdy shoes to protect your feet. Ask about other ways to protect yourself.       •Go to physical or occupational therapy if directed. Your provider may recommend therapy if you have a condition such as carpal tunnel syndrome. A physical therapist can teach you exercises to help strengthen the area or increase your ability to move it. An occupational therapist can help you find new ways to do your daily activities.      •Manage health conditions that can cause paresthesia. Work with your diabetes specialist if you have uncontrolled diabetes. A dietitian or your healthcare provider can help you create a meal plan if you have low vitamin B levels. Your provider can help you manage your health if you have multiple sclerosis or you had a stroke. It is important to manage health conditions to stop paresthesia or prevent it from getting worse.      Follow up with your healthcare provider or neurologist as directed: Your healthcare provider may refer you to a specialist. Write down your questions so you remember to ask them during your visits.       © Copyright [employer] 2021           back to top                          © Copyright [employer] 2021

## 2021-08-30 ENCOUNTER — APPOINTMENT (OUTPATIENT)
Dept: OBGYN | Facility: CLINIC | Age: 31
End: 2021-08-30
Payer: COMMERCIAL

## 2021-08-30 VITALS
WEIGHT: 165 LBS | BODY MASS INDEX: 28.17 KG/M2 | DIASTOLIC BLOOD PRESSURE: 70 MMHG | HEIGHT: 64 IN | SYSTOLIC BLOOD PRESSURE: 110 MMHG

## 2021-08-30 PROCEDURE — 99213 OFFICE O/P EST LOW 20 MIN: CPT

## 2021-08-30 NOTE — PHYSICAL EXAM
[Appropriately responsive] : appropriately responsive [Alert] : alert [No Acute Distress] : no acute distress [Oriented x3] : oriented x3 [FreeTextEntry6] : No masses. No skin changes. No nipple discharge on exam. No cervical or axillary lymphadenopathy.

## 2021-08-30 NOTE — HISTORY OF PRESENT ILLNESS
[N] : Patient does not use contraception [Y] : Positive pregnancy history [Currently Active] : currently active [Men] : men [Vaginal] : vaginal [No] : No [FreeTextEntry1] : 32 y/o \par \par c/o of left breast pain x 4 days.  Drinks plenty of coffee.\par c/o of yellow watery nipple discharge if she squeezes \par No recent breast feeding. Has a 4 yr child.\par No fever.\par \par Last pap 2019 neg HPV, pap\par \par famhx: denies ovarian/colon/breast cancer [TextBox_4] : Pt has sappt in Oct for annual \par \par Pt today states left breast pain and fluid comes out of nipple if she squeezes  [PapSmeardate] : 6/4/19 [TextBox_31] : negative [PGHxTotal] : 4 [[facility] HospitalxFulerm] : 1 [[facility]] : 1

## 2021-08-30 NOTE — DISCUSSION/SUMMARY
[FreeTextEntry1] : 30 y/o\par \par #left breast pain\par -normal breast exam\par -no nipple discharge on exam\par -lifestyle changes discussed\par -left breast US ordered; task list\par \par : Kelsey PIERRE\par \par rto 10/2021 scheduled annual exam

## 2021-09-29 ENCOUNTER — EMERGENCY (EMERGENCY)
Facility: HOSPITAL | Age: 31
LOS: 1 days | Discharge: HOME | End: 2021-09-29
Attending: EMERGENCY MEDICINE
Payer: COMMERCIAL

## 2021-09-29 VITALS
HEART RATE: 89 BPM | HEIGHT: 64 IN | SYSTOLIC BLOOD PRESSURE: 129 MMHG | DIASTOLIC BLOOD PRESSURE: 82 MMHG | RESPIRATION RATE: 18 BRPM | OXYGEN SATURATION: 100 % | TEMPERATURE: 99 F

## 2021-09-29 LAB
ANION GAP SERPL CALC-SCNC: 14 MMOL/L — SIGNIFICANT CHANGE UP (ref 5–17)
APPEARANCE UR: CLEAR — SIGNIFICANT CHANGE UP
BACTERIA # UR AUTO: ABNORMAL
BASOPHILS # BLD AUTO: 0.02 K/UL — SIGNIFICANT CHANGE UP (ref 0–0.2)
BASOPHILS NFR BLD AUTO: 0.2 % — SIGNIFICANT CHANGE UP (ref 0–2)
BILIRUB UR-MCNC: NEGATIVE — SIGNIFICANT CHANGE UP
BUN SERPL-MCNC: 8.2 MG/DL — SIGNIFICANT CHANGE UP (ref 8–20)
CALCIUM SERPL-MCNC: 9.4 MG/DL — SIGNIFICANT CHANGE UP (ref 8.6–10.2)
CHLORIDE SERPL-SCNC: 103 MMOL/L — SIGNIFICANT CHANGE UP (ref 98–107)
CO2 SERPL-SCNC: 22 MMOL/L — SIGNIFICANT CHANGE UP (ref 22–29)
COLOR SPEC: YELLOW — SIGNIFICANT CHANGE UP
CREAT SERPL-MCNC: 0.35 MG/DL — LOW (ref 0.5–1.3)
DIFF PNL FLD: ABNORMAL
EOSINOPHIL # BLD AUTO: 0.15 K/UL — SIGNIFICANT CHANGE UP (ref 0–0.5)
EOSINOPHIL NFR BLD AUTO: 1.4 % — SIGNIFICANT CHANGE UP (ref 0–6)
EPI CELLS # UR: SIGNIFICANT CHANGE UP
GLUCOSE SERPL-MCNC: 91 MG/DL — SIGNIFICANT CHANGE UP (ref 70–99)
GLUCOSE UR QL: NEGATIVE MG/DL — SIGNIFICANT CHANGE UP
HCT VFR BLD CALC: 36.8 % — SIGNIFICANT CHANGE UP (ref 34.5–45)
HGB BLD-MCNC: 12.5 G/DL — SIGNIFICANT CHANGE UP (ref 11.5–15.5)
IMM GRANULOCYTES NFR BLD AUTO: 0.5 % — SIGNIFICANT CHANGE UP (ref 0–1.5)
KETONES UR-MCNC: NEGATIVE — SIGNIFICANT CHANGE UP
LEUKOCYTE ESTERASE UR-ACNC: NEGATIVE — SIGNIFICANT CHANGE UP
LYMPHOCYTES # BLD AUTO: 2.2 K/UL — SIGNIFICANT CHANGE UP (ref 1–3.3)
LYMPHOCYTES # BLD AUTO: 20.2 % — SIGNIFICANT CHANGE UP (ref 13–44)
MCHC RBC-ENTMCNC: 30.6 PG — SIGNIFICANT CHANGE UP (ref 27–34)
MCHC RBC-ENTMCNC: 34 GM/DL — SIGNIFICANT CHANGE UP (ref 32–36)
MCV RBC AUTO: 90.2 FL — SIGNIFICANT CHANGE UP (ref 80–100)
MONOCYTES # BLD AUTO: 0.5 K/UL — SIGNIFICANT CHANGE UP (ref 0–0.9)
MONOCYTES NFR BLD AUTO: 4.6 % — SIGNIFICANT CHANGE UP (ref 2–14)
NEUTROPHILS # BLD AUTO: 7.95 K/UL — HIGH (ref 1.8–7.4)
NEUTROPHILS NFR BLD AUTO: 73.1 % — SIGNIFICANT CHANGE UP (ref 43–77)
NITRITE UR-MCNC: NEGATIVE — SIGNIFICANT CHANGE UP
PH UR: 6.5 — SIGNIFICANT CHANGE UP (ref 5–8)
PLATELET # BLD AUTO: 375 K/UL — SIGNIFICANT CHANGE UP (ref 150–400)
POTASSIUM SERPL-MCNC: 3.4 MMOL/L — LOW (ref 3.5–5.3)
POTASSIUM SERPL-SCNC: 3.4 MMOL/L — LOW (ref 3.5–5.3)
PROT UR-MCNC: NEGATIVE MG/DL — SIGNIFICANT CHANGE UP
RBC # BLD: 4.08 M/UL — SIGNIFICANT CHANGE UP (ref 3.8–5.2)
RBC # FLD: 11.6 % — SIGNIFICANT CHANGE UP (ref 10.3–14.5)
RBC CASTS # UR COMP ASSIST: SIGNIFICANT CHANGE UP /HPF (ref 0–4)
SODIUM SERPL-SCNC: 139 MMOL/L — SIGNIFICANT CHANGE UP (ref 135–145)
SP GR SPEC: 1.01 — SIGNIFICANT CHANGE UP (ref 1.01–1.02)
UROBILINOGEN FLD QL: NEGATIVE MG/DL — SIGNIFICANT CHANGE UP
WBC # BLD: 10.87 K/UL — HIGH (ref 3.8–10.5)
WBC # FLD AUTO: 10.87 K/UL — HIGH (ref 3.8–10.5)
WBC UR QL: SIGNIFICANT CHANGE UP

## 2021-09-29 PROCEDURE — 99285 EMERGENCY DEPT VISIT HI MDM: CPT

## 2021-09-29 NOTE — ED PROVIDER NOTE - OBJECTIVE STATEMENT
32yo F A2 7 weeks pregnant presents to ED c/o vaginal bleeding onset tonight. States she noticed a small amount brown blood on toilet paper when she went to bathroom about 2 hours prior to arrival. The following time she went to bathroom blood appeared more bright red. States she is not actively bleeding, went to bathroom in waiting room of ER and there was no blood on paper. No passage of clots. Has appt with her OBGYN [facility] on Monday. LMP 8/10. Denies fever, vomiting, abdominal pain, urinary sx, back/flank pain. 31 year old F A2 7 weeks pregnant presents to ED c/o vaginal bleeding onset tonight. States she noticed a small amount brown blood on toilet paper when she went to bathroom about 2 hours prior to arrival. The following time she went to bathroom blood appeared more bright red. States she is not actively bleeding, went to bathroom in waiting room of ER and there was no blood on paper. No passage of clots. Has appt with her OBGYN [facility] on Monday. LMP 8/10. Denies fever, vomiting, abdominal pain, urinary sx, back/flank pain.

## 2021-09-29 NOTE — ED ADULT NURSE NOTE - OBJECTIVE STATEMENT
assumed care of pt at 22:10. pt c/o vaginal bleeding which started earlier tonight. pt describes bleeding to be dark brown. denies dizziness or abdominal pain. nausea present. denies burning upon urination. pt reports being 7 weeks pregnant. rr even and unlabored. anox3. able to move all extremities well. pt educated on plan of care, pt able to successfully teach back plan of care to RN, RN will continue to reeducate pt during hospital stay.

## 2021-09-29 NOTE — ED PROVIDER NOTE - PATIENT PORTAL LINK FT
You can access the FollowMyHealth Patient Portal offered by [facility] by registering at the following website: [email or web address]. By joining [employer]’s FollowMyHealth portal, you will also be able to view your health information using other applications (apps) compatible with our system.

## 2021-09-29 NOTE — ED PROVIDER NOTE - NS ED ROS FT
Gen: denies fever, chills, fatigue, weight loss  Skin: denies rashes, laceration, bruising  HEENT: denies visual changes, ear pain, nasal congestion, throat pain  Respiratory: denies LARES, SOB, cough, wheezing  Cardiovascular: denies chest pain, palpitations, diaphoresis, LE edema  GI: denies abdominal pain, n/v/d  : +Vaginal bleeding. denies dysuria, frequency, urgency, bowel/bladder incontinence  MSK: denies joint swelling/pain, back pain, neck pain  Neuro: denies headache, dizziness, weakness, numbness  Psych: denies anxiety, depression, SI/HI, visual/auditory hallucinations Gen: denies fever, chills, fatigue, weight loss  Skin: denies rashes, laceration, bruising  HEENT: denies visual changes, ear pain, nasal congestion, throat pain  Respiratory: denies LARES, SOB, cough, wheezing  Cardiovascular: denies chest pain, palpitations, diaphoresis, LE edema  GI: denies abdominal pain, n/v/d  : +Vaginal bleeding. denies dysuria, frequency, urgency, bowel/bladder incontinence  MSK: denies joint swelling/pain, back pain, neck pain  Neuro: denies headache, dizziness, weakness, numbness  Psych: denies anxiety, depression, SI/HI, visual/auditory hallucinations.

## 2021-09-29 NOTE — ED PROVIDER NOTE - PROGRESS NOTE DETAILS
sono demonstrating "Single live intrauterine gestation with estimated gestational age of 6 weeks and 6 days" results d/w pt. has OB f/u on Monday. no bleeding while in ED. given copy of results, return precautions discussed, stable for dc

## 2021-09-30 LAB
BLD GP AB SCN SERPL QL: SIGNIFICANT CHANGE UP
HCG SERPL-ACNC: HIGH MIU/ML

## 2021-09-30 PROCEDURE — 86901 BLOOD TYPING SEROLOGIC RH(D): CPT

## 2021-09-30 PROCEDURE — 81001 URINALYSIS AUTO W/SCOPE: CPT

## 2021-09-30 PROCEDURE — 76801 OB US < 14 WKS SINGLE FETUS: CPT | Mod: 26

## 2021-09-30 PROCEDURE — 84702 CHORIONIC GONADOTROPIN TEST: CPT

## 2021-09-30 PROCEDURE — 80048 BASIC METABOLIC PNL TOTAL CA: CPT

## 2021-09-30 PROCEDURE — 76817 TRANSVAGINAL US OBSTETRIC: CPT | Mod: 26

## 2021-09-30 PROCEDURE — 86850 RBC ANTIBODY SCREEN: CPT

## 2021-09-30 PROCEDURE — 76801 OB US < 14 WKS SINGLE FETUS: CPT

## 2021-09-30 PROCEDURE — 76817 TRANSVAGINAL US OBSTETRIC: CPT

## 2021-09-30 PROCEDURE — 86900 BLOOD TYPING SEROLOGIC ABO: CPT

## 2021-09-30 PROCEDURE — 36415 COLL VENOUS BLD VENIPUNCTURE: CPT

## 2021-09-30 PROCEDURE — 99284 EMERGENCY DEPT VISIT MOD MDM: CPT | Mod: 25

## 2021-09-30 PROCEDURE — 85025 COMPLETE CBC W/AUTO DIFF WBC: CPT

## 2021-09-30 RX ORDER — POTASSIUM CHLORIDE 20 MEQ
40 PACKET (EA) ORAL ONCE
Refills: 0 | Status: COMPLETED | OUTPATIENT
Start: 2021-09-30 | End: 2021-09-30

## 2021-09-30 RX ADMIN — Medication 40 MILLIEQUIVALENT(S): at 02:06

## 2021-10-04 ENCOUNTER — RESULT CHARGE (OUTPATIENT)
Age: 31
End: 2021-10-04

## 2021-10-04 ENCOUNTER — APPOINTMENT (OUTPATIENT)
Dept: OBGYN | Facility: CLINIC | Age: 31
End: 2021-10-04
Payer: COMMERCIAL

## 2021-10-04 ENCOUNTER — ASOB RESULT (OUTPATIENT)
Age: 31
End: 2021-10-04

## 2021-10-04 VITALS
BODY MASS INDEX: 28.17 KG/M2 | DIASTOLIC BLOOD PRESSURE: 72 MMHG | SYSTOLIC BLOOD PRESSURE: 110 MMHG | WEIGHT: 165 LBS | HEIGHT: 64 IN

## 2021-10-04 PROBLEM — Z78.9 OTHER SPECIFIED HEALTH STATUS: Chronic | Status: ACTIVE | Noted: 2021-09-29

## 2021-10-04 LAB
HCG UR QL: POSITIVE
QUALITY CONTROL: YES

## 2021-10-04 PROCEDURE — 81025 URINE PREGNANCY TEST: CPT

## 2021-10-04 PROCEDURE — 99395 PREV VISIT EST AGE 18-39: CPT

## 2021-10-04 PROCEDURE — 99213 OFFICE O/P EST LOW 20 MIN: CPT | Mod: 25

## 2021-10-04 PROCEDURE — 76817 TRANSVAGINAL US OBSTETRIC: CPT

## 2021-10-13 ENCOUNTER — APPOINTMENT (OUTPATIENT)
Dept: OBGYN | Facility: CLINIC | Age: 31
End: 2021-10-13
Payer: COMMERCIAL

## 2021-10-13 ENCOUNTER — ASOB RESULT (OUTPATIENT)
Age: 31
End: 2021-10-13

## 2021-10-13 VITALS
WEIGHT: 169 LBS | BODY MASS INDEX: 28.85 KG/M2 | DIASTOLIC BLOOD PRESSURE: 66 MMHG | SYSTOLIC BLOOD PRESSURE: 114 MMHG | HEIGHT: 64 IN

## 2021-10-13 LAB
C TRACH RRNA SPEC QL NAA+PROBE: NOT DETECTED
CYTOLOGY CVX/VAG DOC THIN PREP: NORMAL
N GONORRHOEA RRNA SPEC QL NAA+PROBE: NOT DETECTED
SOURCE TP AMPLIFICATION: NORMAL

## 2021-10-13 PROCEDURE — 99214 OFFICE O/P EST MOD 30 MIN: CPT | Mod: 25

## 2021-10-13 PROCEDURE — 76817 TRANSVAGINAL US OBSTETRIC: CPT

## 2021-10-13 PROCEDURE — 0501F PRENATAL FLOW SHEET: CPT

## 2021-10-13 NOTE — DISCUSSION/SUMMARY
[FreeTextEntry1] : PAP/Gc Chlam\par \par Reviewed TV sono with pt:\par Anteverted uterus with a SLIUP 7.4 week. Yolk sac seen. No evidence of a bleed. Cervix appears long and closed. Left lateral fibroid noted 6.25 cm.\par \par Advised pt to avoid raw seafood, deli meats and unpasteurized dairy.\par Discussed importance of hydration.\par \par Call parameters reviewed\par \par RTO in 1 week for New OB

## 2021-10-13 NOTE — HISTORY OF PRESENT ILLNESS
[N] : Patient does not use contraception [Y] : Positive pregnancy history [Menarche Age: ____] : age at menarche was [unfilled] [Currently Active] : currently active [Men] : men [Vaginal] : vaginal [No] : No [Patient refuses STI testing] : Patient refuses STI testing [PapSmeardate] : 6/14/19 [TextBox_31] : neg [GonorrheaDate] : 6/4/19 [TextBox_63] : neg [ChlamydiaDate] : 6/4/19 [TextBox_68] : neg [HPVDate] : 6/4/19 [TextBox_78] : neg [LMPDate] : 8/10/21 [PGHxTotal] : 4 [[facility] CampusxFulerm] : 1 [[facility] Centeriving] : 1 [PGHxABInduced] : 3 [FreeTextEntry1] : 8/10/21

## 2021-10-14 ENCOUNTER — ASOB RESULT (OUTPATIENT)
Age: 31
End: 2021-10-14

## 2021-10-14 ENCOUNTER — APPOINTMENT (OUTPATIENT)
Dept: ANTEPARTUM | Facility: CLINIC | Age: 31
End: 2021-10-14
Payer: COMMERCIAL

## 2021-10-14 ENCOUNTER — APPOINTMENT (OUTPATIENT)
Dept: MATERNAL FETAL MEDICINE | Facility: CLINIC | Age: 31
End: 2021-10-14
Payer: COMMERCIAL

## 2021-10-14 ENCOUNTER — APPOINTMENT (OUTPATIENT)
Dept: ANTEPARTUM | Facility: CLINIC | Age: 31
End: 2021-10-14

## 2021-10-14 VITALS
RESPIRATION RATE: 18 BRPM | HEIGHT: 64 IN | HEART RATE: 71 BPM | WEIGHT: 169 LBS | DIASTOLIC BLOOD PRESSURE: 62 MMHG | BODY MASS INDEX: 28.85 KG/M2 | OXYGEN SATURATION: 99 % | SYSTOLIC BLOOD PRESSURE: 118 MMHG

## 2021-10-14 DIAGNOSIS — Z01.419 ENCOUNTER FOR GYNECOLOGICAL EXAMINATION (GENERAL) (ROUTINE) W/OUT ABNORMAL FINDINGS: ICD-10-CM

## 2021-10-14 DIAGNOSIS — Z3A.30 30 WEEKS GESTATION OF PREGNANCY: ICD-10-CM

## 2021-10-14 DIAGNOSIS — Z34.90 ENCOUNTER FOR SUPERVISION OF NORMAL PREGNANCY, UNSPECIFIED, UNSPECIFIED TRIMESTER: ICD-10-CM

## 2021-10-14 DIAGNOSIS — Z11.3 ENCOUNTER FOR SCREENING FOR INFECTIONS WITH A PREDOMINANTLY SEXUAL MODE OF TRANSMISSION: ICD-10-CM

## 2021-10-14 DIAGNOSIS — Z12.4 ENCOUNTER FOR SCREENING FOR MALIGNANT NEOPLASM OF CERVIX: ICD-10-CM

## 2021-10-14 DIAGNOSIS — Z01.411 ENCOUNTER FOR GYNECOLOGICAL EXAMINATION (GENERAL) (ROUTINE) WITH ABNORMAL FINDINGS: ICD-10-CM

## 2021-10-14 DIAGNOSIS — Z32.01 ENCOUNTER FOR PREGNANCY TEST, RESULT POSITIVE: ICD-10-CM

## 2021-10-14 DIAGNOSIS — N64.4 MASTODYNIA: ICD-10-CM

## 2021-10-14 DIAGNOSIS — Z87.59 PERSONAL HISTORY OF OTHER COMPLICATIONS OF PREGNANCY, CHILDBIRTH AND THE PUERPERIUM: ICD-10-CM

## 2021-10-14 LAB
ABO + RH PNL BLD: NORMAL
BASOPHILS # BLD AUTO: 0.03 K/UL
BASOPHILS NFR BLD AUTO: 0.3 %
BLD GP AB SCN SERPL QL: NORMAL
EOSINOPHIL # BLD AUTO: 0.22 K/UL
EOSINOPHIL NFR BLD AUTO: 2.2 %
HBV SURFACE AG SER QL: NONREACTIVE
HCT VFR BLD CALC: 36.1 %
HCV AB SER QL: NONREACTIVE
HCV S/CO RATIO: 0.56 S/CO
HGB BLD-MCNC: 11.9 G/DL
HIV1+2 AB SPEC QL IA.RAPID: NONREACTIVE
IMM GRANULOCYTES NFR BLD AUTO: 0.4 %
LYMPHOCYTES # BLD AUTO: 1.71 K/UL
LYMPHOCYTES NFR BLD AUTO: 17.1 %
MAN DIFF?: NORMAL
MCHC RBC-ENTMCNC: 30.8 PG
MCHC RBC-ENTMCNC: 33 GM/DL
MCV RBC AUTO: 93.5 FL
MEV IGG FLD QL IA: 10.9 AU/ML
MEV IGG+IGM SER-IMP: NEGATIVE
MONOCYTES # BLD AUTO: 0.58 K/UL
MONOCYTES NFR BLD AUTO: 5.8 %
NEUTROPHILS # BLD AUTO: 7.4 K/UL
NEUTROPHILS NFR BLD AUTO: 74.2 %
PLATELET # BLD AUTO: 409 K/UL
RBC # BLD: 3.86 M/UL
RBC # FLD: 12.2 %
RUBV IGG FLD-ACNC: 2.6 INDEX
RUBV IGG SER-IMP: POSITIVE
T PALLIDUM AB SER QL IA: NEGATIVE
WBC # FLD AUTO: 9.98 K/UL

## 2021-10-14 PROCEDURE — 99204 OFFICE O/P NEW MOD 45 MIN: CPT

## 2021-10-14 PROCEDURE — 36415 COLL VENOUS BLD VENIPUNCTURE: CPT

## 2021-10-14 PROCEDURE — 76817 TRANSVAGINAL US OBSTETRIC: CPT

## 2021-10-14 NOTE — VITALS
[LMP (date): ___] : LMP was on [unfilled] [GA =___ Weeks] : which calculates to a GA of [unfilled] weeks [GA= ___ Days] : and [unfilled] day(s) [KISHORE by LMP (date): ___] : The calculated KISHORE by LMP is [unfilled]

## 2021-10-14 NOTE — FAMILY HISTORY
[Age 35+ During Pregnancy] : not 35 or over during pregnancy [Reported Family History Of Birth Defects] : no congenital heart defects [Wily-Sachs Carrier] : no Wily-Sachs [Family History] : no mental retardation/autism [Reported Family History Of Genetic Disease] : no history of child defect in child of baby father

## 2021-10-15 LAB
BACTERIA UR CULT: NORMAL
RUBV IGM FLD-ACNC: <20 AU/ML
VZV AB TITR SER: POSITIVE
VZV IGG SER IF-ACNC: 178.8 INDEX

## 2021-10-16 LAB
CMV IGG SERPL QL: 2 U/ML
CMV IGG SERPL-IMP: POSITIVE
CMV IGM SERPL QL: 10.3 AU/ML
CMV IGM SERPL QL: NEGATIVE
T GONDII AB SER-IMP: NEGATIVE
T GONDII AB SER-IMP: NEGATIVE
T GONDII IGG SER QL: <3 IU/ML
T GONDII IGM SER QL: 3.9 AU/ML

## 2021-10-16 NOTE — OB HISTORY
[___] : pregnancy complications occured [LMP: ___] : LMP: [unfilled] [KISHORE: ___] : KISHORE: [unfilled] [EGA: ___ wks] : EGA: [unfilled] wks [Spontaneous] : Spontaneous conception [Sonogram] : sonogram [at ___ wks] : at [unfilled] weeks [Definite:  ___ (Date)] : the last menstrual period was [unfilled] [Pregnancy History] : patient did not receive anesthesia [FreeTextEntry1] : Vaginal spotting at 6-7 weeks gestation\par Ultrasound 10/13/21 with concern for fetal edema and pericardial fluid\par Genetic consultation scheduled 10/27/21

## 2021-10-16 NOTE — HISTORY OF PRESENT ILLNESS
[FreeTextEntry1] : Rome Shelton is a 31 y.o.  with LMP of 8/10/21 and EDC of 22 who presents at 9w2d for  consultation secondary to pregnancy complicated by abnormal fetal ultrasound, subchorionic hematoma, and uterine fibroid.  Her BMI is 29 kg/m2.  She reports vaginal spotting at 6-7 weeks which has resolved.  Routine ultrasound performed on the day prior to consultation was significant for subchorionic hematoma and fetal edema.  Ultrasound today confirms the findings and was also notable for uterine myoma.  The patient is appropriately anxious when discussing the above findings but otherwise feels well and reports no obstetrical or constitutional complaint.

## 2021-10-16 NOTE — REVIEW OF SYSTEMS
[Fever] : no fever [Chest Pain] : no chest pain [Cough] : no cough [Abdominal Pain] : no abdominal pain [Vomiting] : no vomiting [Pelvic Pain] : no pelvic pain [Abn Vag Bleeding] : abnormal vaginal bleeding [Arthralgias] : no arthralgias [Breast Pain] : no breast pain [Headache] : no headache [Anxiety] : no anxiety [Depression] : no depression

## 2021-10-16 NOTE — SURGICAL HISTORY
[Fibroids] : fibroids [Last Pap: ___] : Last Pap: [unfilled] [Abn Paps] : no abnormal pap smears [Breast Disease] : no breast disease [STI's] : no STI's [Infertility] : no infertility [Cysts] : no cysts [OC Use] : no OC use [Last Mammo: ___] : Last Mammo: none

## 2021-10-16 NOTE — DATA REVIEWED
[FreeTextEntry1] : TAUS 10/14/21\par Viable dexter with CRL measuring consistent with LMP dating.  Fetal hydrops appreciated.  Subchorionic hemorrhage measuring 2.4 x 1 x 1.5 cm.  Uterine myoma measuring 6.3 x 5.7 x 5.1 cm.\par \par 10/13/21\par O pos / Antibody screen negative

## 2021-10-16 NOTE — CHIEF COMPLAINT
[G ___] : G [unfilled] [P ___] : P [unfilled] [de-identified] : abnormal fetal ultrasound, subchorionic hematoma, and uterine fibroid

## 2021-10-16 NOTE — DISCUSSION/SUMMARY
[FreeTextEntry1] : At the time of consultation, we reviewed the following:\par \par Fetal hydrops:  Ultrasound performed today showed fetal edema in multiple compartments consistent with fetal hydrops.  We reviewed potential etiology, including but not limited to infectious, genetic, structural and syndromic.  We reviewed current prenatal testing options including maternal serum screening, non-invasive prenatal testing using cell-free fetal DNA in maternal blood, chorionic villus sampling, amniocentesis and ultrasound.  We also reviewed the gestational age at which screening and testing may be performed.  She is scheduled for genetic counseling on 10/27/21.  Serology for toxoplasma, CMV, and parvovirus was sent today.  She will return in one week to assess fetal viability and in two weeks for CVS should diagnostic testing be desired.  We reviewed the increased risk for pregnancy loss.  We also reviewed that hydrops may resolve; however, this does not eliminate the possibility of infectious, genetic, structural, or syndromic concern.  All questions were answered to the best of my ability. \par \par Uterine fibroid: Rome was counseled that fibroids are benign smooth muscle tumors of the uterus.  Most pregnant women with fibroids do not have any complications during pregnancy related to the fibroids. Pain is the most common problem, but there may also be a slightly increased risk of obstetrical complications such as miscarriage, premature labor and delivery, abnormal fetal position, and placental abruption.  She may also be at increased risk for  delivery and intrapartum/postpartum atony and hemorrhage.  As the uterus enlarges, the fibroids may cause compression of adjacent organs with subsequent constipation, inability to tolerate large meals and in rare cases bowel obstruction.  She was advised to report any concerning symptoms for prompt evaluation.  Serial growth evaluation is recommended.     \par \par Subchorionic hemorrhage:  We reviewed that vaginal bleeding at any time during the pregnancy is concerning for threatened miscarriage or pregnancy loss.  Ultrasound finding of a subchorionic collection or hematoma seen in the first trimester is an independent risk factor for pregnancy loss.  As the pregnancy progresses, there is also an increased risk of placental abruption,  premature rupture of membranes,  or previable delivery and stillbirth.  However, Rome was advised that not all pregnancies complicated by vaginal bleeding and subchorionic collection have adverse pregnancy outcome. Pregnancy outcome associated with subchorionic hematoma appears to depend upon location, with worse outcomes for retroplacental than marginal hematomas.  The only management option for subchorionic hematoma is expectant management. Sorangel will return in 1 week for reevaluation of fetal viability and the subchorionic collection.

## 2021-10-17 LAB
CYTOMEGALOVIRUS ABS IGM: <30 AU/ML
HERPES SIMPLEX 1 AND 2 ABS IGM: <0.91 RATIO
MEV IGM SER QL: <0.91 ISR
RUBV IGM FLD-ACNC: <20 AU/ML
TOXOPLASMA GONDII ABS IGM: 4.3 AU/ML
VZV IGM SER IF-ACNC: <0.91 INDEX

## 2021-10-18 ENCOUNTER — NON-APPOINTMENT (OUTPATIENT)
Age: 31
End: 2021-10-18

## 2021-10-18 LAB
M TB IFN-G BLD-IMP: POSITIVE
QUANTIFERON TB PLUS MITOGEN MINUS NIL: 9.21 IU/ML
QUANTIFERON TB PLUS NIL: 0.03 IU/ML
QUANTIFERON TB PLUS TB1 MINUS NIL: 0.64 IU/ML
QUANTIFERON TB PLUS TB2 MINUS NIL: 0.67 IU/ML

## 2021-10-19 ENCOUNTER — NON-APPOINTMENT (OUTPATIENT)
Age: 31
End: 2021-10-19

## 2021-10-19 DIAGNOSIS — R76.12 NONSPECIFIC REACTION TO CELL MEDIATED IMMUNITY MEASUREMENT OF GAMMA INTERFERON ANTIGEN RESPONSE W/OUT ACTIVE TUBERCULOSIS: ICD-10-CM

## 2021-10-20 LAB
AR GENE MUT ANL BLD/T: NORMAL
FMR1 GENE MUT ANL BLD/T: NORMAL

## 2021-10-22 ENCOUNTER — ASOB RESULT (OUTPATIENT)
Age: 31
End: 2021-10-22

## 2021-10-22 ENCOUNTER — NON-APPOINTMENT (OUTPATIENT)
Age: 31
End: 2021-10-22

## 2021-10-22 ENCOUNTER — APPOINTMENT (OUTPATIENT)
Dept: ANTEPARTUM | Facility: CLINIC | Age: 31
End: 2021-10-22
Payer: COMMERCIAL

## 2021-10-22 LAB — CFTR MUT TESTED BLD/T: NEGATIVE

## 2021-10-22 PROCEDURE — 76815 OB US LIMITED FETUS(S): CPT

## 2021-10-26 ENCOUNTER — NON-APPOINTMENT (OUTPATIENT)
Age: 31
End: 2021-10-26

## 2021-10-26 ENCOUNTER — EMERGENCY (EMERGENCY)
Facility: HOSPITAL | Age: 31
LOS: 1 days | Discharge: HOME | End: 2021-10-26
Attending: STUDENT IN AN ORGANIZED HEALTH CARE EDUCATION/TRAINING PROGRAM
Payer: COMMERCIAL

## 2021-10-26 ENCOUNTER — RESULT REVIEW (OUTPATIENT)
Age: 31
End: 2021-10-26

## 2021-10-26 VITALS
HEIGHT: 64 IN | SYSTOLIC BLOOD PRESSURE: 110 MMHG | TEMPERATURE: 100 F | RESPIRATION RATE: 20 BRPM | DIASTOLIC BLOOD PRESSURE: 74 MMHG | OXYGEN SATURATION: 100 % | WEIGHT: 169.98 LBS | HEART RATE: 97 BPM

## 2021-10-26 LAB
ALBUMIN SERPL ELPH-MCNC: 4 G/DL — SIGNIFICANT CHANGE UP (ref 3.3–5.2)
ALP SERPL-CCNC: 65 U/L — SIGNIFICANT CHANGE UP (ref 40–120)
ALT FLD-CCNC: 9 U/L — SIGNIFICANT CHANGE UP
ANION GAP SERPL CALC-SCNC: 13 MMOL/L — SIGNIFICANT CHANGE UP (ref 5–17)
AST SERPL-CCNC: 13 U/L — SIGNIFICANT CHANGE UP
BASOPHILS # BLD AUTO: 0.04 K/UL — SIGNIFICANT CHANGE UP (ref 0–0.2)
BASOPHILS NFR BLD AUTO: 0.2 % — SIGNIFICANT CHANGE UP (ref 0–2)
BILIRUB SERPL-MCNC: 0.6 MG/DL — SIGNIFICANT CHANGE UP (ref 0.4–2)
BLD GP AB SCN SERPL QL: SIGNIFICANT CHANGE UP
BUN SERPL-MCNC: 6.4 MG/DL — LOW (ref 8–20)
CALCIUM SERPL-MCNC: 8.4 MG/DL — LOW (ref 8.6–10.2)
CHLORIDE SERPL-SCNC: 102 MMOL/L — SIGNIFICANT CHANGE UP (ref 98–107)
CO2 SERPL-SCNC: 21 MMOL/L — LOW (ref 22–29)
CREAT SERPL-MCNC: 0.37 MG/DL — LOW (ref 0.5–1.3)
EOSINOPHIL # BLD AUTO: 0.03 K/UL — SIGNIFICANT CHANGE UP (ref 0–0.5)
EOSINOPHIL NFR BLD AUTO: 0.2 % — SIGNIFICANT CHANGE UP (ref 0–6)
GLUCOSE SERPL-MCNC: 109 MG/DL — HIGH (ref 70–99)
HCG SERPL-ACNC: 7010 MIU/ML — HIGH
HCT VFR BLD CALC: 27 % — LOW (ref 34.5–45)
HCT VFR BLD CALC: 28.2 % — LOW (ref 34.5–45)
HCT VFR BLD CALC: 34.3 % — LOW (ref 34.5–45)
HGB BLD-MCNC: 11.9 G/DL — SIGNIFICANT CHANGE UP (ref 11.5–15.5)
HGB BLD-MCNC: 9.5 G/DL — LOW (ref 11.5–15.5)
HGB BLD-MCNC: 9.9 G/DL — LOW (ref 11.5–15.5)
IMM GRANULOCYTES NFR BLD AUTO: 0.7 % — SIGNIFICANT CHANGE UP (ref 0–1.5)
LYMPHOCYTES # BLD AUTO: 1.65 K/UL — SIGNIFICANT CHANGE UP (ref 1–3.3)
LYMPHOCYTES # BLD AUTO: 8.5 % — LOW (ref 13–44)
MCHC RBC-ENTMCNC: 31.3 PG — SIGNIFICANT CHANGE UP (ref 27–34)
MCHC RBC-ENTMCNC: 31.6 PG — SIGNIFICANT CHANGE UP (ref 27–34)
MCHC RBC-ENTMCNC: 31.6 PG — SIGNIFICANT CHANGE UP (ref 27–34)
MCHC RBC-ENTMCNC: 34.7 GM/DL — SIGNIFICANT CHANGE UP (ref 32–36)
MCHC RBC-ENTMCNC: 35.1 GM/DL — SIGNIFICANT CHANGE UP (ref 32–36)
MCHC RBC-ENTMCNC: 35.2 GM/DL — SIGNIFICANT CHANGE UP (ref 32–36)
MCV RBC AUTO: 89.7 FL — SIGNIFICANT CHANGE UP (ref 80–100)
MCV RBC AUTO: 90.1 FL — SIGNIFICANT CHANGE UP (ref 80–100)
MCV RBC AUTO: 90.3 FL — SIGNIFICANT CHANGE UP (ref 80–100)
MONOCYTES # BLD AUTO: 0.68 K/UL — SIGNIFICANT CHANGE UP (ref 0–0.9)
MONOCYTES NFR BLD AUTO: 3.5 % — SIGNIFICANT CHANGE UP (ref 2–14)
NEUTROPHILS # BLD AUTO: 16.81 K/UL — HIGH (ref 1.8–7.4)
NEUTROPHILS NFR BLD AUTO: 86.9 % — HIGH (ref 43–77)
PLATELET # BLD AUTO: 334 K/UL — SIGNIFICANT CHANGE UP (ref 150–400)
PLATELET # BLD AUTO: 376 K/UL — SIGNIFICANT CHANGE UP (ref 150–400)
PLATELET # BLD AUTO: 411 K/UL — HIGH (ref 150–400)
POTASSIUM SERPL-MCNC: 3.6 MMOL/L — SIGNIFICANT CHANGE UP (ref 3.5–5.3)
POTASSIUM SERPL-SCNC: 3.6 MMOL/L — SIGNIFICANT CHANGE UP (ref 3.5–5.3)
PROT SERPL-MCNC: 6.7 G/DL — SIGNIFICANT CHANGE UP (ref 6.6–8.7)
RBC # BLD: 3.01 M/UL — LOW (ref 3.8–5.2)
RBC # BLD: 3.13 M/UL — LOW (ref 3.8–5.2)
RBC # BLD: 3.8 M/UL — SIGNIFICANT CHANGE UP (ref 3.8–5.2)
RBC # FLD: 11.5 % — SIGNIFICANT CHANGE UP (ref 10.3–14.5)
RBC # FLD: 11.6 % — SIGNIFICANT CHANGE UP (ref 10.3–14.5)
RBC # FLD: 11.6 % — SIGNIFICANT CHANGE UP (ref 10.3–14.5)
SODIUM SERPL-SCNC: 136 MMOL/L — SIGNIFICANT CHANGE UP (ref 135–145)
WBC # BLD: 17.53 K/UL — HIGH (ref 3.8–10.5)
WBC # BLD: 18.97 K/UL — HIGH (ref 3.8–10.5)
WBC # BLD: 19.34 K/UL — HIGH (ref 3.8–10.5)
WBC # FLD AUTO: 17.53 K/UL — HIGH (ref 3.8–10.5)
WBC # FLD AUTO: 18.97 K/UL — HIGH (ref 3.8–10.5)
WBC # FLD AUTO: 19.34 K/UL — HIGH (ref 3.8–10.5)

## 2021-10-26 PROCEDURE — 36415 COLL VENOUS BLD VENIPUNCTURE: CPT

## 2021-10-26 PROCEDURE — 84702 CHORIONIC GONADOTROPIN TEST: CPT

## 2021-10-26 PROCEDURE — 88305 TISSUE EXAM BY PATHOLOGIST: CPT

## 2021-10-26 PROCEDURE — 76801 OB US < 14 WKS SINGLE FETUS: CPT

## 2021-10-26 PROCEDURE — 85025 COMPLETE CBC W/AUTO DIFF WBC: CPT

## 2021-10-26 PROCEDURE — 99284 EMERGENCY DEPT VISIT MOD MDM: CPT | Mod: 25

## 2021-10-26 PROCEDURE — 85027 COMPLETE CBC AUTOMATED: CPT

## 2021-10-26 PROCEDURE — 80053 COMPREHEN METABOLIC PANEL: CPT

## 2021-10-26 PROCEDURE — 88305 TISSUE EXAM BY PATHOLOGIST: CPT | Mod: 26

## 2021-10-26 PROCEDURE — 86901 BLOOD TYPING SEROLOGIC RH(D): CPT

## 2021-10-26 PROCEDURE — 76801 OB US < 14 WKS SINGLE FETUS: CPT | Mod: 26

## 2021-10-26 PROCEDURE — 99284 EMERGENCY DEPT VISIT MOD MDM: CPT

## 2021-10-26 PROCEDURE — 96374 THER/PROPH/DIAG INJ IV PUSH: CPT

## 2021-10-26 PROCEDURE — 86850 RBC ANTIBODY SCREEN: CPT

## 2021-10-26 PROCEDURE — 86900 BLOOD TYPING SEROLOGIC ABO: CPT

## 2021-10-26 RX ORDER — MISOPROSTOL 200 UG/1
4 TABLET ORAL
Qty: 4 | Refills: 0
Start: 2021-10-26 | End: 2021-10-26

## 2021-10-26 RX ORDER — ONDANSETRON 8 MG/1
4 TABLET, FILM COATED ORAL ONCE
Refills: 0 | Status: COMPLETED | OUTPATIENT
Start: 2021-10-26 | End: 2021-10-26

## 2021-10-26 RX ORDER — SODIUM CHLORIDE 9 MG/ML
1000 INJECTION, SOLUTION INTRAVENOUS ONCE
Refills: 0 | Status: COMPLETED | OUTPATIENT
Start: 2021-10-26 | End: 2021-10-26

## 2021-10-26 RX ORDER — SODIUM CHLORIDE 9 MG/ML
1000 INJECTION INTRAMUSCULAR; INTRAVENOUS; SUBCUTANEOUS ONCE
Refills: 0 | Status: COMPLETED | OUTPATIENT
Start: 2021-10-26 | End: 2021-10-26

## 2021-10-26 RX ADMIN — SODIUM CHLORIDE 1000 MILLILITER(S): 9 INJECTION INTRAMUSCULAR; INTRAVENOUS; SUBCUTANEOUS at 17:23

## 2021-10-26 RX ADMIN — SODIUM CHLORIDE 1000 MILLILITER(S): 9 INJECTION, SOLUTION INTRAVENOUS at 23:51

## 2021-10-26 RX ADMIN — ONDANSETRON 4 MILLIGRAM(S): 8 TABLET, FILM COATED ORAL at 23:28

## 2021-10-26 NOTE — ED ADULT TRIAGE NOTE - CHIEF COMPLAINT QUOTE
pt states she had miscarriage friday after 10 weeks. pt states she started vaginal bleeding this morning.

## 2021-10-26 NOTE — ED PROVIDER NOTE - NSFOLLOWUPINSTRUCTIONS_ED_ALL_ED_FT
- Prescription sent to pharmacy.  - Please follow up with your OBGYN on Monday.   - Please bring all documentation from your ED visit to any related future follow up appointment.  - Please call to schedule follow up appointment with your primary care physician within 24-48 hours.  - Please seek immediate medical attention or return to the ED for any new/worsening, signs/symptoms, or concerns including but not limited to lightheadedness, dizzy, chest pain, shortness of breath, continued vaginal bleeding.     Feel better!     - Receta enviada a farmacia.  - Qi un seguimiento con ventura obstetra el lunes.  - Lleve toda la documentación de ventura visita a urgencias a cualquier oleksandr de seguimiento futura relacionada.  - Llame para programar eliazar oleksandr de seguimiento con ventura médico de atención primaria dentro de las 24 a 48 horas.  - Busque atención médica inmediata o regrese al servicio de urgencias por cualquier signo / síntoma nuevo / que empeore, o inquietudes que incluyen, entre otros, aturdimiento, mareos, dolor en el pecho, dificultad para respirar, sangrado vaginal continuo.    ¡Sentirse mejor!      Aborto espontáneo    Miscarriage      El aborto espontáneo es la pérdida de un embarazo antes de la semana 20. La mayor parte de los abortos espontáneos ocurre derrick los primeros 3 meses de embarazo. A veces, un aborto ocurre antes de que la lamont sepa que está embarazada.    El aborto espontáneo puede ser eliazar experiencia que afecte emocionalmente a la persona. Si ha sufrido un aborto espontáneo, hable con el médico y hágale las preguntas que tenga sobre la pérdida del bebé, el proceso de duelo y los planes futuros de embarazo.      ¿Cuáles son las causas?    Muchas veces la causa del aborto espontáneo no se conoce.      ¿Qué incrementa el riesgo?    Los siguientes factores pueden hacer que eliazar embarazada sea más propensa a sufrir un aborto espontáneo:    Ciertas enfermedades crónicas     •Enfermedades que afectan al equilibrio hormonal del cuerpo, hu eliazar enfermedad tiroidea o síndrome del ovario poliquístico.      •Diabetes.      •Trastornos autoinmunitarios.      •Infecciones.      •Trastornos hemorrágicos.      •Obesidad.      Factores de estilo de luis     •Consumir productos con tabaco o nicotina o estar expuesta al humo del tabaco.      •Beber alcohol.      •Consumir grandes cantidades de cafeína.      •Consumo de drogas.      Problemas relacionados con las estructuras o los órganos genitales     •Insuficiencia cervical. [place] es cuando la parte más baja del útero (prasad uterino) se abre y se hace más delgada antes de que el embarazo llegue a término.      •Padecer eliazar afección llamada síndrome de [place]. Alia síndrome causa la formación de cicatrices en el útero o hace que el útero tenga eliazar estructura anormal.      •Crecimientos fibrosos, llamados fibromas, en el útero.      •Anormalidades congénitas. Estos son problemas que ya están presentes en el nacimiento.      •Infección en el prasad del útero.      Antecedentes personales o médicos     •Lesiones (traumatismos).      •Tommy tenido un aborto espontáneo antes.      •Ser tawana de 18 años o mayor de 35 años de edad.      •Exposición a sustancias nocivas del medio ambiente. [place] puede incluir radiación o metales pesados, hu el plomo.      •Uso de ciertos medicamentos.        ¿Cuáles son los signos o síntomas?  Los síntomas de esta afección incluyen:  •Sangrado o manchado vaginal, con o sin cólicos o dolor.      •Dolor o cólicos en el abdomen o en la parte inferior de la espalda.      •[place] de líquido o tejido por la vagina.        ¿Cómo se diagnostica?  Esta afección se puede diagnosticar en función de lo siguiente:  •Un examen físico.      •Eliazar ecografía.      •Pruebas de laboratorio, hu análisis de ann-marie, análisis de orina o hisopados para detectar eliazar infección.        ¿Cómo se trata?    A veces no es necesario deborah tratamiento a un aborto espontáneo si todo el tejido fetal que estaba en el útero sale por sí solo y no hay otros problemas hu infección o sangrado abundante.  En otros casos, esta afección puede tratarse con lo siguiente:  •Dilatación y curetaje (D y C). En alia procedimiento, se expande el prasad uterino y se extrae todo anai de tejido fetal del recubrimiento del útero (endometrio).    •Medicamentos. Pueden incluir:  •Antibióticos para tratar eliazar infección.      •Medicamentos para expulsar los restos de tejido fetal del cuerpo.      •Medicamentos para reducir (contraer) el tamaño del útero. Estos medicamentos se pueden administrar si hay un sangrado abundante.        Si tiene ann-marie Rh negativa, se le puede administrar eliazar inyección de un medicamento llamado inmunoglobulina Rho(D). Alia medicamento ayuda a prevenir problemas en futuros embarazos.      Siga estas instrucciones en ventura casa:    Medicamentos     •Use los medicamentos de venta felicia y los recetados solamente hu se lo haya indicado el médico.      •Si le recetaron antibióticos, tómelos hu se lo haya indicado el médico. No deje de abrahan el antibiótico aunque comience a sentirse mejor.      Actividad     •Qi reposo hu se lo haya indicado el médico. Pregúntele al médico qué actividades son seguras para usted.      •Pídale a alguien que la ayude con las responsabilidades familiares y del hogar derrick alia tiempo.        Instrucciones generales      •Esté atenta a la cantidad de tejido o coágulos de ann-marie que sale de la vagina.      • No tenga relaciones sexuales, no se qi duchas vaginales ni se introduzca nada, hu tampones, en la vagina hasta que el médico la autorice.      •Para que usted y ventura doreen puedan sobrellevar el proceso del duelo, hable con ventura médico o consiga apoyo psicológico.      •Cuando esté lista, visite a ventura médico para hablar sobre los pasos importantes que deberá seguir en relación con ventura shanthi. También hable sobre las medidas que deberá abrahan para tener un embarazo saludable en el futuro.      •Cumpla con todas las visitas de seguimiento. [place] es importante.        Dónde buscar más información    •The American College of Obstetricians and Gynecologists (Colegio Estadounidense de Obstetras y Ginecólogos): acog.org      •[place] Department of Health and Human Services, Office on Women’s Health (Departamento de Shanthi y Servicios Humanos de los [place], Oficina de Shanthi de la [place]): hrsa.gov/office-womens-health        Comuníquese con un médico si:    •Tiene fiebre o escalofríos.      •Le sale líquido con mal olor de la vagina.      •El sangrado aumenta en vez de disminuir.      •Le salen coágulos de ann-marie o tejido de la vagina.        Solicite ayuda de inmediato si:    •Siente calambres intensos o dolor en la espalda o en el abdomen.      •Tiene un sangrado abundante que llena 2 apósitos sanitarios grandes por hora derrick más de 2 horas.      •Se siente mareada o débil.      •Se desmaya.      •Se siente alec, y zina tristeza se apodera de lisa pensamientos.      •Piensa en hacerse daño.    Si alguna vez siente que puede lastimarse o lastimar a otras personas, o tiene pensamientos de poner fin a ventura luis, busque ayuda de inmediato. Diríjase al servicio de urgencias más cercano o:    • Comuníquese con el servicio de emergencias de ventura localidad (911 en los [place]).       • Llame a eliazar línea de asistencia al suicida y atención en crisis hu National Suicide Prevention Lifeline (Línea Nacional de Prevención del Suicidio) al 1-977.542.3968. Está disponible las 24 horas del día en los . [place].       • Envíe un mensaje de texto a la línea para casos de crisis al [id number] (en los . .).         Resumen    •La mayor parte de los abortos espontáneos ocurre derrick los primeros 3 meses de embarazo. En algunos casos, el aborto espontáneo ocurre antes de que la lamont sepa que está embarazada.      •Siga las instrucciones del médico acerca de los medicamentos y la actividad.      •Para que usted y ventura doreen puedan sobrellevar el duelo, hable con ventura médico o consiga apoyo psicológico.      •Cumpla con todas las visitas de seguimiento.      Esta información no tiene hu fin reemplazar el consejo del médico. Asegúrese de hacerle al médico cualquier pregunta que tenga.

## 2021-10-26 NOTE — ED PROVIDER NOTE - PATIENT PORTAL LINK FT
You can access the FollowMyHealth Patient Portal offered by [facility] by registering at the following website: [email or web address]. By joining [employer]’s FollowMyHealth portal, you will also be able to view your health information using other applications (apps) compatible with our system.

## 2021-10-26 NOTE — CONSULT NOTE ADULT - ASSESSMENT
SM is a 32 yo  at 11 weeks GA and LMP 8/10/21, presenting to the ED with chief concern of vaginal bleeding. Physical exam findings and symptoms consistent with anemia 2/2 blood loss and miscarriage.    -bolus 1L fluids  -repeat CBC, H&H  -cytotec administration buccally SM is a 30 yo  at 11 weeks GA with complete Ab.     -additional LR bolus   -Hgb 9.9 --> 9.5, remained stable  -f/u POC path  -cytotec 800mcg sent to pharmacy  -f/u at [facility] by Monday  -return precautions reviewed    discussed with Dr. Chapin SM is a 32 yo  at 11 weeks GA with complete Ab.     -additional LR bolus   -Hgb 9.9 --> 9.5, remained stable  -US read appreciated  -f/u POC path  -cytotec 800mcg sent to pharmacy  -f/u at [facility] by Monday  -return precautions reviewed    discussed with Dr. Chapin

## 2021-10-26 NOTE — ED PROVIDER NOTE - CLINICAL SUMMARY MEDICAL DECISION MAKING FREE TEXT BOX
Pt is a 31y F presenting for vaginal bleeding with sono with no heart beat. Will obtain labs and reassess.

## 2021-10-26 NOTE — ED PROVIDER NOTE - OBJECTIVE STATEMENT
Pt is a 31y F with no PMH A2 presenting for vaginal bleeding. Pt is 10 weeks pregnant. She is seen by [facility] in [place]. Pt states she originally had  US which showed a heart beat but there was fluid around the heart. She went to get a repeat sono at a specialist and there was no heart beat. She was not bleeding but was advised that she might start to bleed. Pt began bleeding around 5am. It started getting heavy at 2pm. LMP was . She denies any other complaints. NKDA.

## 2021-10-26 NOTE — ED ADULT NURSE REASSESSMENT NOTE - NS ED NURSE REASSESS COMMENT FT1
pt excreting large clots from vagina. VALERIE dennis made aware. OB called to bedside. vss taken. pt vomiting. denies dizziness or chest pain.

## 2021-10-26 NOTE — CONSULT NOTE ADULT - NSCONSULTADDITIONALINFOA_GEN_ALL_CORE
OB/Gyn attending:   patient with complete ab  Hemodynamically stable  Rh+  encouraged to f/u within one week with her regular GYN  Juana Chapin MD

## 2021-10-26 NOTE — CONSULT NOTE ADULT - SUBJECTIVE AND OBJECTIVE BOX
Subjective:  SM is a 32 yo  at 11 weeks GA and LMP 8/10/21 presents to the ED with chief concern of vaginal bleeding. On Friday, the patient visited an U/S specialist (Kaylah Shaikh), and a fetal heartbeat was unable to be found. She received a call back from Dr. Reis at the [facility] discussing the implications; either the womb would evacuate contents naturally, or a surgical procedure could be scheduled. The patient received another call/voicemail about scheduling the surgical procedure. At 5am this morning, she started experiencing light vaginal bleeding, that progressed to very heavy bleeding around 2pm. The bleeding took up 2-3 pads per hour until 4pm, at which point it filled a diaper. The consistency of bleeding is thicker than blood alone, but patient is not certain whether there is tissue as well. She denies SOB, Headache, diarrhea, constipation, fever, or chills. She endorses weakness lightheadedness, dizziness, nausea, vomiting, and lower abdominal pain.    Pregnancy Course:  -Fetal heartbeat unable to be found at U/S appointment Friday 10/22    ObHx: miscarriage 2016, 5yo son born 2017, miscarriage 2019  GynHx: recent STI testing 2 weeks and 3 year ago WNL  PMH: denies  PSH: cyst removal L side of nose at 16 years of age  Allergies: pollen  Meds: prenatal vitamins  SocialHx: denies              Objective:    Vital Signs Last 24 Hrs  T(C): 37.5 (26 Oct 2021 21:34), Max: 37.5 (26 Oct 2021 16:30)  T(F): 99.5 (26 Oct 2021 21:34), Max: 99.5 (26 Oct 2021 16:30)  HR: 85 (26 Oct 2021 21:34) (85 - 97)  BP: 115/76 (26 Oct 2021 21:34) (110/74 - 115/76)  BP(mean): --  RR: 20 (26 Oct 2021 21:34) (20 - 20)  SpO2: 100% (26 Oct 2021 21:34) (100% - 100%)    FTH: N/A  [place]: N/A    Physical Exam:  Gen:  CV:  Pulm:  Abd:  SVE:  Ext: Subjective:  SM is a 30 yo  at 11 weeks GA and LMP 8/10/21 presents to the ED with chief concern of vaginal bleeding. On Friday, the patient visited an U/S specialist (Kaylah Shaikh), and a fetal heartbeat was unable to be found. She received a call back from Dr. Reis at the [facility] discussing the implications; either the womb would evacuate contents naturally, or a surgical procedure could be scheduled. The patient received another call/voicemail about scheduling the surgical procedure. At 5am this morning, she started experiencing light vaginal bleeding, that progressed to very heavy bleeding around 2pm. The bleeding took up 2-3 pads per hour until 4pm, at which point it filled a diaper. The consistency of bleeding is thicker than blood alone, but patient is not certain whether there is tissue as well. She denies SOB, Headache, diarrhea, constipation, fever, or chills. She endorses weakness lightheadedness, dizziness, nausea, vomiting, and lower abdominal pain.    Pregnancy Course:  -Fetal heartbeat unable to be found at U/S appointment Friday 10/22    ObHx: miscarriage 2016, 5yo son born 2017, miscarriage 2019  GynHx: recent STI testing 2 weeks and 3 year ago WNL  PMH: denies  PSH: cyst removal L side of nose at 16 years of age  FamHx: DM and thyroid disease (likely hypothyroidism) on mother's side  Allergies: pollen  Meds: prenatal vitamins  SocialHx: denies              Objective:    Vital Signs Last 24 Hrs  T(C): 37.5 (26 Oct 2021 21:34), Max: 37.5 (26 Oct 2021 16:30)  T(F): 99.5 (26 Oct 2021 21:34), Max: 99.5 (26 Oct 2021 16:30)  HR: 85 (26 Oct 2021 21:34) (85 - 97)  BP: 115/76 (26 Oct 2021 21:34) (110/74 - 115/76)  BP(mean): --  RR: 20 (26 Oct 2021 21:34) (20 - 20)  SpO2: 100% (26 Oct 2021 21:34) (100% - 100%)    FTH: N/A  [place]: N/A    Physical Exam:  Gen: NAD, well-appearing, fatigued  CV: RRR, no murmurs, rubs, or gallops  Pulm: CTA B/L  Abd: soft, nondistended,nontender  SVE:  Ext: nontender Subjective:  SM is a 30 yo  at 11 weeks GA and LMP 8/10/21 presents to the ED with chief concern of vaginal bleeding. On Friday, the patient visited an U/S specialist (Kaylah Posey), and a fetal heartbeat was unable to be found. She received a call back from Dr. Reis at the [facility] discussing the implications; either the womb would evacuate contents naturally, or a surgical procedure could be scheduled. The patient received another call/voicemail about scheduling the surgical procedure. At 5am this morning, she started experiencing light vaginal bleeding, that progressed to very heavy bleeding around 2pm. The bleeding took up 2-3 pads per hour until 4pm, at which point it filled a diaper. The consistency of bleeding is thicker than blood alone, but patient is not certain whether there is tissue as well. She denies SOB, Headache, diarrhea, constipation, fever, or chills. She endorses weakness lightheadedness, dizziness, nausea, vomiting, and lower abdominal pain.    Pregnancy Course:  -Fetal heartbeat unable to be found at U/S appointment Friday 10/22    ObHx: miscarriage 2016, 5yo son born 2017, miscarriage 2019  GynHx: recent STI testing 2 weeks and 3 year ago WNL  PMH: denies  PSH: cyst removal L side of nose at 16 years of age  FamHx: DM and thyroid disease (likely hypothyroidism) on mother's side  Allergies: pollen  Meds: prenatal vitamins  SocialHx: denies              Objective:    Vital Signs Last 24 Hrs  T(C): 37.5 (26 Oct 2021 21:34), Max: 37.5 (26 Oct 2021 16:30)  T(F): 99.5 (26 Oct 2021 21:34), Max: 99.5 (26 Oct 2021 16:30)  HR: 85 (26 Oct 2021 21:34) (85 - 97)  BP: 115/76 (26 Oct 2021 21:34) (110/74 - 115/76)  BP(mean): --  RR: 20 (26 Oct 2021 21:34) (20 - 20)  SpO2: 100% (26 Oct 2021 21:34) (100% - 100%)    FTH: N/A  [place]: N/A    Physical Exam:  Gen: NAD, well-appearing, fatigued  CV: RRR, no murmurs, rubs, or gallops  Pulm: CTA B/L  Abd: soft, nondistended,nontender  SVE:  Ext: nontender 32 yo  @11w GA and LMP 8/10/21 presents to the ED with heavy vaginal bleeding and painful cramping in the setting of a mAb. Endorses weakness, lightheadedness, dizziness, n/v. On Friday, the patient had an ultrasound where no fetal heartbeat was detected. She was counseled on expectant vs surgical vs medical management, and she elected for surgical management and was planning on scheduling the procedure. She started having vaginal bleeding at 0500, which increased in intensity, saturating 2-3 pads per hour until 4pm, at which point it filled a diaper. She denies SOB, headache, diarrhea, constipation, fever, or chills.    ED: Pt received zofran and a bolus of fluids in the ED.    ObHx: SAbx2 , ;  2017  GynHx: denies STD  PMH: denies  PSH: cyst removal L side of nose at 16 years of age  FamHx: DM and thyroid disease on mother's side  Allergies: pollen  Meds: prenatal vitamins  SocialHx: denies    Vital Signs Last 24 Hrs  T(C): 37.3 (27 Oct 2021 00:25), Max: 37.5 (26 Oct 2021 16:30)  T(F): 99.1 (27 Oct 2021 00:25), Max: 99.5 (26 Oct 2021 16:30)  HR: 97 (26 Oct 2021 23:26) (85 - 97)  BP: 109/71 (26 Oct 2021 23:26) (109/71 - 115/76)  RR: 20 (26 Oct 2021 23:26) (20 - 20)  SpO2: 100% (26 Oct 2021 23:26) (100% - 100%)    Gen: NAD, appears tired, pale  Resp: breathing comfortably on RA  Abd: soft, nondistended, nontender  Spec: active bleeding noted, products of conception visualized, removed with ring forceps  VE: 1 cm dilated                          9.5    17.53 )-----------( 376      ( 26 Oct 2021 23:26 )             27.0      30 yo  @11w GA and LMP 8/10/21 presents to the ED with heavy vaginal bleeding and painful cramping in the setting of a mAb. Endorses weakness, lightheadedness, dizziness, n/v. On Friday, the patient had an ultrasound where no fetal heartbeat was detected. She was counseled on expectant vs surgical vs medical management, and she elected for surgical management and was planning on scheduling the procedure. She started having vaginal bleeding at 0500, which increased in intensity, saturating 2-3 pads per hour until 4pm, at which point it filled a diaper. She denies SOB, headache, diarrhea, constipation, fever, or chills.    ED: Pt received zofran and a bolus of fluids in the ED.    ObHx: SAbx2 , ;  2015  GynHx: denies STD  PMH: denies  PSH: cyst removal L side of nose at 16 years of age  FamHx: DM and thyroid disease on mother's side  Allergies: pollen  Meds: prenatal vitamins  SocialHx: denies    Vital Signs Last 24 Hrs  T(C): 37.3 (27 Oct 2021 00:25), Max: 37.5 (26 Oct 2021 16:30)  T(F): 99.1 (27 Oct 2021 00:25), Max: 99.5 (26 Oct 2021 16:30)  HR: 97 (26 Oct 2021 23:26) (85 - 97)  BP: 109/71 (26 Oct 2021 23:26) (109/71 - 115/76)  RR: 20 (26 Oct 2021 23:26) (20 - 20)  SpO2: 100% (26 Oct 2021 23:26) (100% - 100%)    Gen: NAD, appears tired, pale  Resp: breathing comfortably on RA  Abd: soft, nondistended, nontender  Spec: active bleeding noted, products of conception visualized, removed with ring forceps  VE: 1 cm dilated                          9.5    17.53 )-----------( 376      ( 26 Oct 2021 23:26 )             27.0     < from: US OB <=14 Weeks, First Gestation (10.26.21 @ 21:31) >  FINDINGS:    Uterus: 11.9 x 4.8 x 9 cm containing fibroid measuring up to 5.4 cm in the left uterine body. No intrauterine gestational sac or fetal pole identified. Thickened endometrial echo complex measuring up to 2.8 cm without significant vascularity identified, likely hemorrhagic products. Complex hypoechogenicity measuring 6.9 x 6.3 cm within the cervix without internal vascularity, likely hemorrhagic products.    Ovaries:  The right ovary measures 2.6 x 2 x 1.6 cm.  Vascular flow is normal. No adnexal mass seen.    The left ovary measures 2.2 x 2 x 2.8 cm. Vascular flow is normal. No adnexal mass seen.    There is no free fluid seen in the pelvis.    IMPRESSION:    No intrauterine gestational sac or fetal heart identified.    Thickened endometrial echo complex and complex hypoechogenicity measuring up to 6.9 cm within the cervix without internal vascularity, likely hemorrhagic products. Retained products of conception less likely. Correlate with serial beta-hCG and short-term imaging follow-up is advised.    < end of copied text >   32 yo  @11w GA and LMP 8/10/21 presents to the ED with heavy vaginal bleeding and painful cramping in the setting of a mAb. Endorses weakness, lightheadedness, dizziness, n/v. On Friday, the patient had an ultrasound where no fetal heartbeat was detected. She was counseled on expectant vs surgical vs medical management, and she elected for surgical management and was planning on scheduling the procedure. She started having vaginal bleeding at 0500, which increased in intensity, saturating 2-3 pads per hour until 4pm, at which point it filled a diaper. She denies SOB, headache, diarrhea, constipation, fever, or chills.    ED: Pt received zofran and a bolus of fluids in the ED.    ObHx: SAbx2 , ;  2015  GynHx: denies STD  PMH: denies  PSH: cyst removal L side of nose at 16 years of age  FamHx: DM and thyroid disease on mother's side  Allergies: pollen  Meds: prenatal vitamins  SocialHx: denies    Vital Signs Last 24 Hrs  T(C): 37.3 (27 Oct 2021 00:25), Max: 37.5 (26 Oct 2021 16:30)  T(F): 99.1 (27 Oct 2021 00:25), Max: 99.5 (26 Oct 2021 16:30)  HR: 97 (26 Oct 2021 23:26) (85 - 97)  BP: 109/71 (26 Oct 2021 23:26) (109/71 - 115/76)  RR: 20 (26 Oct 2021 23:26) (20 - 20)  SpO2: 100% (26 Oct 2021 23:26) (100% - 100%)    Gen: NAD, appears tired  Resp: breathing comfortably on RA  Abd: soft, nondistended, nontender  Spec: active bleeding noted, products of conception visualized, removed with ring forceps  VE: 1 cm dilated                          9.5    17.53 )-----------( 376      ( 26 Oct 2021 23:26 )             27.0     < from: US OB <=14 Weeks, First Gestation (10.26.21 @ 21:31) >  FINDINGS:    Uterus: 11.9 x 4.8 x 9 cm containing fibroid measuring up to 5.4 cm in the left uterine body. No intrauterine gestational sac or fetal pole identified. Thickened endometrial echo complex measuring up to 2.8 cm without significant vascularity identified, likely hemorrhagic products. Complex hypoechogenicity measuring 6.9 x 6.3 cm within the cervix without internal vascularity, likely hemorrhagic products.    Ovaries:  The right ovary measures 2.6 x 2 x 1.6 cm.  Vascular flow is normal. No adnexal mass seen.    The left ovary measures 2.2 x 2 x 2.8 cm. Vascular flow is normal. No adnexal mass seen.    There is no free fluid seen in the pelvis.    IMPRESSION:    No intrauterine gestational sac or fetal heart identified.    Thickened endometrial echo complex and complex hypoechogenicity measuring up to 6.9 cm within the cervix without internal vascularity, likely hemorrhagic products. Retained products of conception less likely. Correlate with serial beta-hCG and short-term imaging follow-up is advised.    < end of copied text >

## 2021-10-26 NOTE — ED PROVIDER NOTE - PROGRESS NOTE DETAILS
obgyn evaluating patient VALERIE Gannon: Received sign out from VALERIE Nur. 3rd CBC stable, VSS. ?passed clots vs. products. OB aware and sent to pathology. Pending IVF and likely DC. VALERIE Gannon: VSS, patient asymptomatic, ready for discharge.

## 2021-10-27 ENCOUNTER — APPOINTMENT (OUTPATIENT)
Dept: MATERNAL FETAL MEDICINE | Facility: CLINIC | Age: 31
End: 2021-10-27

## 2021-10-27 ENCOUNTER — NON-APPOINTMENT (OUTPATIENT)
Age: 31
End: 2021-10-27

## 2021-10-27 VITALS
DIASTOLIC BLOOD PRESSURE: 71 MMHG | RESPIRATION RATE: 20 BRPM | OXYGEN SATURATION: 100 % | SYSTOLIC BLOOD PRESSURE: 120 MMHG

## 2021-10-28 ENCOUNTER — NON-APPOINTMENT (OUTPATIENT)
Age: 31
End: 2021-10-28

## 2021-10-29 ENCOUNTER — APPOINTMENT (OUTPATIENT)
Dept: OBGYN | Facility: CLINIC | Age: 31
End: 2021-10-29
Payer: COMMERCIAL

## 2021-10-29 ENCOUNTER — APPOINTMENT (OUTPATIENT)
Dept: MATERNAL FETAL MEDICINE | Facility: CLINIC | Age: 31
End: 2021-10-29

## 2021-10-29 ENCOUNTER — APPOINTMENT (OUTPATIENT)
Dept: ANTEPARTUM | Facility: CLINIC | Age: 31
End: 2021-10-29

## 2021-10-29 VITALS
WEIGHT: 169 LBS | BODY MASS INDEX: 28.85 KG/M2 | SYSTOLIC BLOOD PRESSURE: 118 MMHG | DIASTOLIC BLOOD PRESSURE: 62 MMHG | HEIGHT: 64 IN

## 2021-10-29 DIAGNOSIS — O36.21X0: ICD-10-CM

## 2021-10-29 DIAGNOSIS — Z3A.09 9 WEEKS GESTATION OF PREGNANCY: ICD-10-CM

## 2021-10-29 DIAGNOSIS — D64.9 ANEMIA, UNSPECIFIED: ICD-10-CM

## 2021-10-29 DIAGNOSIS — O28.3 ABNORMAL ULTRASONIC FINDING ON ANTENATAL SCREENING OF MOTHER: ICD-10-CM

## 2021-10-29 PROCEDURE — 36415 COLL VENOUS BLD VENIPUNCTURE: CPT

## 2021-10-29 PROCEDURE — 99212 OFFICE O/P EST SF 10 MIN: CPT

## 2021-10-29 NOTE — END OF VISIT
[FreeTextEntry3] : I, Jaz Castro, solely acted as a scribe for Dr. Clover Nunez on 10/29/2021 . All medical entries made by the Scribe were at my, Dr. Nunez's, direction and personally dictated by me on 10/29/2021. I have reviewed the chart and agree that the record accurately reflects my personal performance of the history, physical exam, assessment and plan. I have also personally directed, reviewed and agreed with the chart.

## 2021-10-29 NOTE — DISCUSSION/SUMMARY
[FreeTextEntry1] : Blood work ordered: Beta hcg and CBC. WIll need to follow down HCG weekly.\par \par Discussed referral for [facility] specialist given recurrent miscarriages.\par \par Support given. \par \par RTO in 1 week for a repeat blood work or sooner pending blood work results.

## 2021-10-29 NOTE — HISTORY OF PRESENT ILLNESS
[N] : Patient does not use contraception [Y] : Positive pregnancy history [Menarche Age: ____] : age at menarche was [unfilled] [Currently Active] : currently active [Men] : men [Vaginal] : vaginal [No] : No [TextBox_4] : Pt presents for a f/u of missed ab. [PapSmeardate] : 10/04/2021 [TextBox_31] : NORMAL [GonorrheaDate] : 10/04/2021 [TextBox_63] : NEG [ChlamydiaDate] : 10/04/2021 [TextBox_68] : NEG [LMPDate] : 08/10/2021 [PGHxTotal] : 5 [[facility]xFulerm] : 1 [[facility] Hospitaliving] : 1 [PGHxABInduced] : 3 [PGHxABSpont] : 1 [FreeTextEntry1] : 08/10/2021

## 2021-10-30 LAB — SURGICAL PATHOLOGY STUDY: SIGNIFICANT CHANGE UP

## 2021-11-02 LAB
BASOPHILS # BLD AUTO: 0.04 K/UL
BASOPHILS NFR BLD AUTO: 0.3 %
EOSINOPHIL # BLD AUTO: 0.15 K/UL
EOSINOPHIL NFR BLD AUTO: 1.2 %
HCT VFR BLD CALC: 25.9 %
HGB BLD-MCNC: 8.2 G/DL
IMM GRANULOCYTES NFR BLD AUTO: 0.6 %
LYMPHOCYTES # BLD AUTO: 2.67 K/UL
LYMPHOCYTES NFR BLD AUTO: 20.9 %
MAN DIFF?: NORMAL
MCHC RBC-ENTMCNC: 30.9 PG
MCHC RBC-ENTMCNC: 31.7 GM/DL
MCV RBC AUTO: 97.7 FL
MONOCYTES # BLD AUTO: 0.6 K/UL
MONOCYTES NFR BLD AUTO: 4.7 %
NEUTROPHILS # BLD AUTO: 9.22 K/UL
NEUTROPHILS NFR BLD AUTO: 72.3 %
PLATELET # BLD AUTO: 391 K/UL
RBC # BLD: 2.65 M/UL
RBC # FLD: 12.1 %
WBC # FLD AUTO: 12.76 K/UL

## 2021-11-05 ENCOUNTER — APPOINTMENT (OUTPATIENT)
Dept: OBGYN | Facility: CLINIC | Age: 31
End: 2021-11-05
Payer: COMMERCIAL

## 2021-11-05 PROCEDURE — 36415 COLL VENOUS BLD VENIPUNCTURE: CPT

## 2021-11-09 LAB — HCG SERPL-MCNC: 1059 MIU/ML

## 2021-11-10 ENCOUNTER — NON-APPOINTMENT (OUTPATIENT)
Age: 31
End: 2021-11-10

## 2021-11-10 ENCOUNTER — APPOINTMENT (OUTPATIENT)
Dept: OBGYN | Facility: CLINIC | Age: 31
End: 2021-11-10

## 2021-11-11 ENCOUNTER — APPOINTMENT (OUTPATIENT)
Dept: ANTEPARTUM | Facility: CLINIC | Age: 31
End: 2021-11-11

## 2021-11-13 ENCOUNTER — APPOINTMENT (OUTPATIENT)
Dept: OBGYN | Facility: CLINIC | Age: 31
End: 2021-11-13
Payer: COMMERCIAL

## 2021-11-13 DIAGNOSIS — O03.9 COMPLETE OR UNSPECIFIED SPONTANEOUS ABORTION W/OUT COMPLICATION: ICD-10-CM

## 2021-11-13 PROCEDURE — 36415 COLL VENOUS BLD VENIPUNCTURE: CPT

## 2021-11-15 ENCOUNTER — NON-APPOINTMENT (OUTPATIENT)
Age: 31
End: 2021-11-15

## 2021-11-15 DIAGNOSIS — O02.1 MISSED ABORTION: ICD-10-CM

## 2021-11-15 LAB — HCG SERPL-MCNC: 41 MIU/ML

## 2021-11-17 LAB — HCG SERPL-MCNC: 150 MIU/ML

## 2021-11-20 ENCOUNTER — APPOINTMENT (OUTPATIENT)
Dept: OBGYN | Facility: CLINIC | Age: 31
End: 2021-11-20
Payer: COMMERCIAL

## 2021-11-20 PROCEDURE — 36415 COLL VENOUS BLD VENIPUNCTURE: CPT

## 2021-11-30 ENCOUNTER — NON-APPOINTMENT (OUTPATIENT)
Age: 31
End: 2021-11-30

## 2021-11-30 LAB — HCG SERPL-MCNC: 18 MIU/ML

## 2021-12-03 ENCOUNTER — APPOINTMENT (OUTPATIENT)
Dept: OBGYN | Facility: CLINIC | Age: 31
End: 2021-12-03
Payer: COMMERCIAL

## 2021-12-03 PROCEDURE — 36415 COLL VENOUS BLD VENIPUNCTURE: CPT

## 2021-12-04 LAB — HCG SERPL-MCNC: 2 MIU/ML

## 2022-02-03 ENCOUNTER — RESULT REVIEW (OUTPATIENT)
Age: 32
End: 2022-02-03

## 2022-02-03 ENCOUNTER — APPOINTMENT (OUTPATIENT)
Dept: OBGYN | Facility: CLINIC | Age: 32
End: 2022-02-03
Payer: COMMERCIAL

## 2022-02-03 VITALS
BODY MASS INDEX: 28.73 KG/M2 | DIASTOLIC BLOOD PRESSURE: 70 MMHG | HEIGHT: 64 IN | WEIGHT: 168.25 LBS | SYSTOLIC BLOOD PRESSURE: 110 MMHG

## 2022-02-03 DIAGNOSIS — O34.10 MATERNAL CARE FOR BENIGN TUMOR OF CORPUS UTERI, UNSPECIFIED TRIMESTER: ICD-10-CM

## 2022-02-03 DIAGNOSIS — D25.9 MATERNAL CARE FOR BENIGN TUMOR OF CORPUS UTERI, UNSPECIFIED TRIMESTER: ICD-10-CM

## 2022-02-03 DIAGNOSIS — O26.859 SPOTTING COMPLICATING PREGNANCY, UNSPECIFIED TRIMESTER: ICD-10-CM

## 2022-02-03 PROCEDURE — 81003 URINALYSIS AUTO W/O SCOPE: CPT | Mod: QW

## 2022-02-03 PROCEDURE — 99214 OFFICE O/P EST MOD 30 MIN: CPT

## 2022-02-04 ENCOUNTER — RESULT REVIEW (OUTPATIENT)
Age: 32
End: 2022-02-04

## 2022-02-04 ENCOUNTER — APPOINTMENT (OUTPATIENT)
Dept: MAMMOGRAPHY | Facility: CLINIC | Age: 32
End: 2022-02-04
Payer: COMMERCIAL

## 2022-02-04 ENCOUNTER — APPOINTMENT (OUTPATIENT)
Dept: ULTRASOUND IMAGING | Facility: CLINIC | Age: 32
End: 2022-02-04
Payer: COMMERCIAL

## 2022-02-04 ENCOUNTER — OUTPATIENT (OUTPATIENT)
Dept: OUTPATIENT SERVICES | Facility: HOSPITAL | Age: 32
LOS: 1 days | End: 2022-02-04
Payer: COMMERCIAL

## 2022-02-04 DIAGNOSIS — N63.20 UNSPECIFIED LUMP IN THE LEFT BREAST, UNSPECIFIED QUADRANT: ICD-10-CM

## 2022-02-04 PROBLEM — O26.859 SPOTTING IN PREGNANCY: Status: RESOLVED | Noted: 2021-10-04 | Resolved: 2022-02-04

## 2022-02-04 PROBLEM — O34.10 UTERINE FIBROID IN PREGNANCY: Status: RESOLVED | Noted: 2021-10-13 | Resolved: 2022-02-04

## 2022-02-04 PROCEDURE — G0279: CPT

## 2022-02-04 PROCEDURE — 77065 DX MAMMO INCL CAD UNI: CPT

## 2022-02-04 PROCEDURE — 77066 DX MAMMO INCL CAD BI: CPT | Mod: 26

## 2022-02-04 PROCEDURE — 77066 DX MAMMO INCL CAD BI: CPT

## 2022-02-04 PROCEDURE — 76641 ULTRASOUND BREAST COMPLETE: CPT

## 2022-02-04 PROCEDURE — 76641 ULTRASOUND BREAST COMPLETE: CPT | Mod: 26,50

## 2022-02-04 PROCEDURE — G0279: CPT | Mod: 26

## 2022-02-04 NOTE — PHYSICAL EXAM
[Mass] : mass [Tender] : tender [FreeTextEntry6] : Left breast with large area of erythema and induration at 6 o'clcok with some skin sloughing and large elongated mass palpated about 6 cm long go to middle of breast.

## 2022-02-04 NOTE — HISTORY OF PRESENT ILLNESS
[Patient reported PAP Smear was normal] : Patient reported PAP Smear was normal [N] : Patient does not use contraception [Y] : Patient is sexually active [Monogamous (Male Partner)] : is monogamous with a male partner [Menarche Age: ____] : age at menarche was [unfilled] [No] : Patient does not have concerns regarding sex [Currently Active] : currently active [PapSmeardate] : 10/04/21 [TextBox_31] : NEG [GonorrheaDate] : 10/04/21 [TextBox_63] : NEG [ChlamydiaDate] : 10/04/21 [TextBox_68] : NEG [LMPDate] : 01/26/22 [PGHxTotal] : 5 [[facility] CenterxFulerm] : 1 [[facility] Instituteiving] : 1 [PGHxABInduced] : 3 [PGHxABSpont] : 1 [FreeTextEntry1] : 01/26/22

## 2022-02-04 NOTE — REVIEW OF SYSTEMS
[Patient Intake Form Reviewed] : Patient intake form was reviewed [Breast Pain] : breast pain [Breast Lump] : breast lump

## 2022-02-04 NOTE — PLAN
[FreeTextEntry1] : Left breast mammo and sono today or tomorrow.\par \par Rx for dicloxacillin in the event it is a large abscess, which i don't think it is. It is not as tender to touch as I would expect.\par \par Pt referred to breast specialist ASAP. Appt scheduled 2/22/22 with Dr Mathew.

## 2022-02-08 LAB
BILIRUB UR QL STRIP: NORMAL
CLARITY UR: CLEAR
COLLECTION METHOD: NORMAL
GLUCOSE UR-MCNC: NORMAL
HCG UR QL: 0.2 EU/DL
HGB UR QL STRIP.AUTO: NORMAL
KETONES UR-MCNC: NORMAL
LEUKOCYTE ESTERASE UR QL STRIP: NORMAL
NITRITE UR QL STRIP: NORMAL
PH UR STRIP: 8.5
PROT UR STRIP-MCNC: NORMAL
SP GR UR STRIP: 1.01

## 2022-02-09 ENCOUNTER — APPOINTMENT (OUTPATIENT)
Dept: OBGYN | Facility: CLINIC | Age: 32
End: 2022-02-09
Payer: COMMERCIAL

## 2022-02-09 VITALS
SYSTOLIC BLOOD PRESSURE: 112 MMHG | HEIGHT: 64 IN | DIASTOLIC BLOOD PRESSURE: 80 MMHG | BODY MASS INDEX: 28.68 KG/M2 | WEIGHT: 168 LBS

## 2022-02-09 DIAGNOSIS — N64.4 MASTODYNIA: ICD-10-CM

## 2022-02-09 DIAGNOSIS — N63.20 UNSPECIFIED LUMP IN THE LEFT BREAST, UNSPECIFIED QUADRANT: ICD-10-CM

## 2022-02-09 PROCEDURE — 99213 OFFICE O/P EST LOW 20 MIN: CPT

## 2022-02-13 PROBLEM — N64.4 PAIN OF LEFT BREAST: Status: ACTIVE | Noted: 2022-02-03

## 2022-02-13 PROBLEM — N63.20 LEFT BREAST MASS: Status: ACTIVE | Noted: 2022-02-03

## 2022-02-14 ENCOUNTER — APPOINTMENT (OUTPATIENT)
Dept: BREAST CENTER | Facility: CLINIC | Age: 32
End: 2022-02-14
Payer: COMMERCIAL

## 2022-02-14 ENCOUNTER — LABORATORY RESULT (OUTPATIENT)
Age: 32
End: 2022-02-14

## 2022-02-14 VITALS
DIASTOLIC BLOOD PRESSURE: 75 MMHG | WEIGHT: 168 LBS | BODY MASS INDEX: 28.68 KG/M2 | SYSTOLIC BLOOD PRESSURE: 129 MMHG | HEIGHT: 64 IN | HEART RATE: 83 BPM

## 2022-02-14 DIAGNOSIS — Z72.3 LACK OF PHYSICAL EXERCISE: ICD-10-CM

## 2022-02-14 DIAGNOSIS — Z72.89 OTHER PROBLEMS RELATED TO LIFESTYLE: ICD-10-CM

## 2022-02-14 PROCEDURE — 99204 OFFICE O/P NEW MOD 45 MIN: CPT | Mod: 25

## 2022-02-14 PROCEDURE — 10061 I&D ABSCESS COMP/MULTIPLE: CPT

## 2022-02-14 PROCEDURE — 76642 ULTRASOUND BREAST LIMITED: CPT

## 2022-02-14 RX ORDER — DICLOXACILLIN SODIUM 500 MG/1
500 CAPSULE ORAL 4 TIMES DAILY
Qty: 28 | Refills: 0 | Status: DISCONTINUED | COMMUNITY
Start: 2022-02-03 | End: 2022-02-14

## 2022-02-15 NOTE — PAST MEDICAL HISTORY
[Menarche Age ____] : age at menarche was [unfilled] [Regular Cycle Intervals] : have been regular [Total Preg ___] : G[unfilled] [Live Births ___] : P[unfilled]  [Age At Live Birth ___] : Age at live birth: [unfilled] [FreeTextEntry8] : 6 mo

## 2022-02-15 NOTE — PROCEDURE
[FreeTextEntry1] : L breast US and incision and drainage of L breast abscess [FreeTextEntry2] : L breast abscess [FreeTextEntry3] : The patient was placed in a supine position, and the left lower inner breast was scanned in both transverse and sagittal orientations. At the area of the induration at the 7-8:00 position is a fluid collection that extends beyond the probe, and is deep medially but comes to the surface by the periareolar region, which is the area of the fluctuance. This correlates with the findings on physical exam and recent ultrasound. My impression is that this is an abscess, BIRADS 2, and given the size and extent, incision and drainage is recommended. \par \par The procedure was reviewed. Consent was obtained. The left lower and medial breast was prepped with betadine and sterilely draped. Timeout was performed. 8 ml of 1% lidocaine was injected for local anesthesia. A 1.5 cm incision was made with an #11 scalpel, with drainage of about 15 ml of pus. Culture was sent. A sterile q-tip was used to explore the abscess cavity which had both superficial pockets medially as well as deep pockets inferior-medially. The cavity was irrigated with sterile saline. A penrose drain was placed inferiormedially into the deeper aspect of the abscess cavity and sutured to the skin with 4-0 nylon suture. The wound was dressed with gauze, abd pad, and tape. The patient tolerated the procedure.

## 2022-02-15 NOTE — CONSULT LETTER
[Dear  ___] : Dear  [unfilled], [Consult Letter:] : I had the pleasure of evaluating your patient, [unfilled]. [Please see my note below.] : Please see my note below. [Consult Closing:] : Thank you very much for allowing me to participate in the care of this patient.  If you have any questions, please do not hesitate to contact me. [Sincerely,] : Sincerely, [DrRachelle  ___] : Dr. VILLARREAL [FreeTextEntry3] : Lorie Mathew MD FACS

## 2022-02-15 NOTE — HISTORY OF PRESENT ILLNESS
[FreeTextEntry1] : Ms. Shelton is a 32 year old woman who presents for a consultation for a left breast lump. In August, she noticed a swelling in the left lateral breast. In January, the area became larger, with redness and burning pain; no fevers or chills. She was started on dicloxacillin on 2/3. Last week, the area spontaneously opened and drained blood then pus. Her symptoms have improved, although she still has some burning pain. She finished her antibiotics last Friday. No nipple discharge. No history of smoking or nipple piercing.\par \par Her family history is not significant for any breast cancer.

## 2022-02-15 NOTE — PHYSICAL EXAM
[Normocephalic] : normocephalic [Atraumatic] : atraumatic [Sclera nonicteric] : sclera nonicteric [Supple] : supple [No Supraclavicular Adenopathy] : no supraclavicular adenopathy [No Cervical Adenopathy] : no cervical adenopathy [Clear to Auscultation Bilat] : clear to auscultation bilaterally [Normal Sinus Rhythm] : normal sinus rhythm [No Rubs] : no pericardial rub [Examined in the supine and seated position] : examined in the supine and seated position [Bra Size: ___] : Bra Size: [unfilled] [No dominant masses] : no dominant masses in right breast  [No Nipple Retraction] : no left nipple retraction [No Nipple Discharge] : no left nipple discharge [No Axillary Lymphadenopathy] : no left axillary lymphadenopathy [Soft] : abdomen soft [No Edema] : no edema [No Ulceration] : no ulceration [No Rashes] : no rashes [de-identified] : Mass in LIQ with induration, erythema, tenderness and fluctuance below and beyond the areola. Ulcerated area at 5:00 with no active drainage.

## 2022-02-15 NOTE — DATA REVIEWED
[FreeTextEntry1] : B/l mammogram and complete US 2/4/22\par - extremely dense\par - density in L lower and LIQ breast; large fluid collection with debris from 4:00 to 8-9:00 with skin thickening\par - BIRADS 3; clinical followup

## 2022-02-16 NOTE — PLAN
[FreeTextEntry1] : Pt to continue antibiotics.\par \par Case discussed with nurses who will have her appointment moved up immediately with breast specialist.

## 2022-02-16 NOTE — PHYSICAL EXAM
[Appropriately responsive] : appropriately responsive [Alert] : alert [No Acute Distress] : no acute distress [Oriented x3] : oriented x3 [FreeTextEntry6] : Approx 16 cm irregular mass, 7x6 cm area of skin excoriation, edematous and with peu de orange

## 2022-02-16 NOTE — HISTORY OF PRESENT ILLNESS
[N] : Patient does not use contraception [Menarche Age: ____] : age at menarche was [unfilled] [No] : Patient does not have concerns regarding sex [Y] : Patient is sexually active [Monogamous] : is monogamous [Currently Active] : currently active [Mammogramdate] : 02/04/22 [TextBox_19] : BR3 [BreastSonogramDate] : 02/04/22 [TextBox_25] : BR3 [PapSmeardate] : 10/04/21 [TextBox_31] : NEG [GonorrheaDate] : 10/04/21 [TextBox_63] : NEG [ChlamydiaDate] : 10/04/21 [TextBox_68] : NEG [HPVDate] : 06/04/19 [TextBox_78] : NEG [LMPDate] : 01/29/22 [PGHxTotal] : 5 [[facility] GilbertxFulerm] : 1 [[facility] Centeriving] : 1 [PGHxABInduced] : 3 [PGHxABSpont] : 1 [FreeTextEntry1] : 01/29/22

## 2022-02-17 ENCOUNTER — APPOINTMENT (OUTPATIENT)
Dept: OBGYN | Facility: CLINIC | Age: 32
End: 2022-02-17

## 2022-02-22 ENCOUNTER — APPOINTMENT (OUTPATIENT)
Dept: BREAST CENTER | Facility: CLINIC | Age: 32
End: 2022-02-22
Payer: COMMERCIAL

## 2022-02-22 VITALS
WEIGHT: 168 LBS | HEIGHT: 64 IN | SYSTOLIC BLOOD PRESSURE: 116 MMHG | DIASTOLIC BLOOD PRESSURE: 74 MMHG | HEART RATE: 71 BPM | BODY MASS INDEX: 28.68 KG/M2

## 2022-02-22 PROCEDURE — 99024 POSTOP FOLLOW-UP VISIT: CPT

## 2022-02-22 NOTE — DATA REVIEWED
[FreeTextEntry1] : B/l mammogram and complete US 2/4/22\par - extremely dense\par - density in L lower and LIQ breast; large fluid collection with debris from 4:00 to 8-9:00 with skin thickening\par - BIRADS 3; clinical followup \par \par Culture 2/14/22\par - no growth

## 2022-02-22 NOTE — PHYSICAL EXAM
[Normocephalic] : normocephalic [Atraumatic] : atraumatic [Sclera nonicteric] : sclera nonicteric [Examined in the supine and seated position] : examined in the supine and seated position [Bra Size: ___] : Bra Size: [unfilled] [No dominant masses] : no dominant masses in right breast  [No Nipple Retraction] : no left nipple retraction [No Nipple Discharge] : no left nipple discharge [No Edema] : no edema [No Rashes] : no rashes [No Ulceration] : no ulceration [de-identified] : Medial periareolar incision with Penrose drain. A teaspoonful of drainage on dressing which was changed an hour ago. Ulcerated area in LOQ is dry without any drainage. Induration in LIQ and inferior breast much improved. Residual hyperpigmentation.

## 2022-02-22 NOTE — HISTORY OF PRESENT ILLNESS
[FreeTextEntry1] : Ms. Shelton is a 32 year old woman here for a followup for a left breast abscess. In August, she noticed a swelling in the left lateral breast. In January, the area became larger, with redness and burning pain; no fevers or chills. She was started on dicloxacillin on 2/3. Two weeks ago, the area spontaneously opened and drained blood then pus, but she still had burning pain. At the last office visit, she underwent an incision and drainage with drainage of copious pus, and started on augmentin. Since then, no more pain. There is still drainage where she changes the dressing 3 times a day and there is about a teaspoonful of drainage on the dressing each time. No fevers.\par \par Her family history is not significant for any breast cancer.

## 2022-02-25 ENCOUNTER — APPOINTMENT (OUTPATIENT)
Dept: BREAST CENTER | Facility: CLINIC | Age: 32
End: 2022-02-25
Payer: COMMERCIAL

## 2022-02-25 PROCEDURE — 99024 POSTOP FOLLOW-UP VISIT: CPT

## 2022-02-25 NOTE — PHYSICAL EXAM
[de-identified] : medial periareolar incision with Penrose drain sutured in place. Dressing with approximately one tablespoon of yellowish drainage noted over the last 24 hours.  Hyperpigmentation along medial and inferior breast noted.  Dime sized area of ulceration noted in LOQ (at approximately 5:00) clean and without any drainage.\par Penrose drain is removed without difficulty , Gentle pressure applied to the area of induration surrounding drain site fails to elicit any drainage. 1/4 inch plain packing placed to site .

## 2022-02-25 NOTE — HISTORY OF PRESENT ILLNESS
[FreeTextEntry1] : This is a 32 year old female here for follow up / drain removal for left breast.  Patient was first seen in the office approximately two weeks ago.  Dr. Mathew performed an I&D of left breast abscess with placement of Penrose drain  was performed at that time. Patient is here today with her  and they report that drainage has decreased.  She has a gauze pad with scant yellowish drainage noted which is the total drainage over the last 24 hours.  She denies fevers, chills and denies breast discomfort.  She completed a course of Dicloxacillin (prescribed by GYN) and then Augmentin prescribed by Dr. Mathew.

## 2022-03-02 ENCOUNTER — APPOINTMENT (OUTPATIENT)
Dept: BREAST CENTER | Facility: CLINIC | Age: 32
End: 2022-03-02

## 2022-04-21 ENCOUNTER — APPOINTMENT (OUTPATIENT)
Dept: BREAST CENTER | Facility: CLINIC | Age: 32
End: 2022-04-21
Payer: COMMERCIAL

## 2022-04-21 VITALS
BODY MASS INDEX: 28.68 KG/M2 | SYSTOLIC BLOOD PRESSURE: 112 MMHG | DIASTOLIC BLOOD PRESSURE: 77 MMHG | HEIGHT: 64 IN | HEART RATE: 69 BPM | WEIGHT: 168 LBS

## 2022-04-21 DIAGNOSIS — N61.1 ABSCESS OF THE BREAST AND NIPPLE: ICD-10-CM

## 2022-04-21 PROCEDURE — 99214 OFFICE O/P EST MOD 30 MIN: CPT

## 2022-04-21 RX ORDER — AMOXICILLIN AND CLAVULANATE POTASSIUM 875; 125 MG/1; MG/1
875-125 TABLET, COATED ORAL
Qty: 14 | Refills: 0 | Status: DISCONTINUED | COMMUNITY
Start: 2022-02-14 | End: 2022-04-21

## 2022-04-21 RX ORDER — OXYCODONE 5 MG/1
5 TABLET ORAL
Qty: 10 | Refills: 0 | Status: DISCONTINUED | COMMUNITY
Start: 2022-02-14 | End: 2022-04-21

## 2022-04-21 NOTE — HISTORY OF PRESENT ILLNESS
[FreeTextEntry1] : Ms. Shelton is a 32 year old woman here for a followup for a left breast abscess. Her breast history is notable for an incision and drainage of a left breast abscess at the end of February. A week ago, a few drops of pink fluid drained from her prior incision. No redness, pain, swelling. No other drainage since. \par \par Her family history is not significant for any breast cancer.

## 2022-04-21 NOTE — PHYSICAL EXAM
[Normocephalic] : normocephalic [Atraumatic] : atraumatic [Sclera nonicteric] : sclera nonicteric [Supple] : supple [No Supraclavicular Adenopathy] : no supraclavicular adenopathy [No Cervical Adenopathy] : no cervical adenopathy [Clear to Auscultation Bilat] : clear to auscultation bilaterally [Normal Sinus Rhythm] : normal sinus rhythm [Examined in the supine and seated position] : examined in the supine and seated position [Bra Size: ___] : Bra Size: [unfilled] [No dominant masses] : no dominant masses in right breast  [No dominant masses] : no dominant masses left breast [No Nipple Retraction] : no left nipple retraction [No Nipple Discharge] : no left nipple discharge [No Axillary Lymphadenopathy] : no left axillary lymphadenopathy [Soft] : abdomen soft [No Edema] : no edema [No Rashes] : no rashes [No Ulceration] : no ulceration [de-identified] : Medial periareolar scar; no opening or drainage. Prior sinus site in LOQ. Nodular tissue in medial and inferior periareolar and breast region. No erythema, induration, warmth or pain

## 2022-06-17 ENCOUNTER — APPOINTMENT (OUTPATIENT)
Dept: OBGYN | Facility: CLINIC | Age: 32
End: 2022-06-17

## 2022-10-25 NOTE — ED ADULT TRIAGE NOTE - NS ED NURSE BANDS TYPE
Patient: Deborah Matta Date of Service: 10/25/2022   : 1975 MRN: [id number]     SUBJECTIVE:     HISTORY OF PRESENT ILLNESS:  Deborah Matta is a 47 year old female who presents today for physical     Annual     Last pap smear- 2021   Have been normal since     Diet: eating healthy   Drinking water     Exercise: walking the dog, getting kids where they need to be  Almost 16 & 10           PAST MEDICAL HISTORY:  Past Medical History:   Diagnosis Date   • Anemia    • Depression    • REESE (generalized anxiety disorder)    • Low blood pressure    • Pinched nerve    • Seasonal affective disorder (CMS/HCC)        MEDICATIONS:  Current Outpatient Medications   Medication Sig   • fluocinonide (LIDEX) 0.05 % cream Apply topically 2 times daily.     No current facility-administered medications for this visit.       ALLERGIES:  ALLERGIES:   Allergen Reactions   • Prozac Other (See Comments)       PAST SURGICAL HISTORY:  Past Surgical History:   Procedure Laterality Date   • Total abdom hysterectomy         FAMILY HISTORY:  Family History   Problem Relation Age of Onset   • Cancer, Breast Mother    • Non-Hodgkin's Lymphoma Father    • Alcohol Abuse Brother    • Anxiety disorder Paternal Grandmother    • Anxiety disorder Paternal Grandfather        SOCIAL HISTORY:  Social History     Tobacco Use   • Smoking status: Never Smoker   • Smokeless tobacco: Never Used   Substance Use Topics   • Alcohol use: Yes     Alcohol/week: 1.0 standard drink     Types: 1 Glasses of wine per week       Review of Systems      OBJECTIVE:     Physical Exam  Vitals and nursing note reviewed.   Constitutional:       Appearance: Normal appearance. She is well-developed and normal weight.   HENT:      Head: Normocephalic and atraumatic.      Right Ear: Tympanic membrane and external ear normal.      Left Ear: Tympanic membrane, ear canal and external ear normal.      Nose: Nose normal.      Mouth/Throat:      Mouth: Mucous membranes are  moist.   Eyes:      Conjunctiva/sclera: Conjunctivae normal.      Pupils: Pupils are equal, round, and reactive to light.   Cardiovascular:      Rate and Rhythm: Normal rate and regular rhythm.      Pulses: Normal pulses.      Heart sounds: Normal heart sounds.   Pulmonary:      Effort: Pulmonary effort is normal.      Breath sounds: Normal breath sounds. No wheezing.   Chest:      Comments: Normal breast exam bilaterally   Abdominal:      Palpations: Abdomen is soft.      Tenderness: There is no abdominal tenderness.   Genitourinary:     Comments: External and internal exam normal, no cervix  Musculoskeletal:         General: Normal range of motion.      Cervical back: Normal range of motion.      Right lower leg: No edema.      Left lower leg: No edema.   Lymphadenopathy:      Cervical: No cervical adenopathy.   Skin:     General: Skin is warm and dry.   Neurological:      General: No focal deficit present.      Mental Status: She is alert and oriented to person, place, and time.   Psychiatric:         Mood and Affect: Mood normal.         Behavior: Behavior normal.         Thought Content: Thought content normal.         Visit Vitals  /78   Pulse 74   Temp 98.4 °F (36.9 °C)   Ht 5' 4\" (1.626 m)   Wt 87.5 kg (193 lb)   SpO2 98%   BMI 33.13 kg/m²         Wt Readings from Last 1 Encounters:   10/25/22 87.5 kg (193 lb)          Assessment AND PLAN:     This is a 47 year old year-old female who presents with     Diagnoses and all orders for this visit:  Health maintenance examination  -     PAP ORDER  -     CBC WITH DIFFERENTIAL; Future  -     COMPREHENSIVE METABOLIC PANEL; Future  -     LIPID PANEL WITHOUT REFLEX; Future  -     THYROID STIMULATING HORMONE REFLEX; Future  -     GLYCOHEMOGLOBIN; Future  -     VITAMIN B12 AND FOLATE; Future  Screen for colon cancer  -     OPEN ACCESS COLONOSCOPY  Tremor  Allergic eczema  -     fluocinonide (LIDEX) 0.05 % cream; Apply topically 2 times daily.  healthy diet  Reg  exercise  Colonoscopy   Drink enough water  Check blood work     No follow-ups on file.    The patient indicated understanding of the diagnosis and agreed with the plan of care.      Awa Edmond, DO  10/25/2022   Name band;

## 2023-05-03 NOTE — ED ADULT NURSE NOTE - NS ED NOTE ABUSE SUSPICION NEGLECT YN
Level of Care: Telemetry [5]   Admitting Physician: JONH FINNEY [[id number]]   Attending Physician: JONH FINNEY [[id number]]   No

## 2023-07-06 NOTE — DISCHARGE NOTE OB - CHANGE SANITARY PADS FREQUENTLY.  WASHING AND WIPING SHOULD OCCUR FROM FRONT TO BACK
Infusion Nursing Note:  Tiffanie Housermerlenerubi presents today for [place].    Patient seen by provider today: No   present during visit today: Not Applicable.    Note: Patient arrives ambulatory, is feeling pretty good other than a flare up with her seasonal allergies. Denies fever, cough or chills. VSS, afebrile, no pain. Ice applied to injection site prior to administration.      Intravenous Access:  No Intravenous access/labs at this visit.    Treatment Conditions:  Not Applicable.      Post Infusion Assessment:  Patient tolerated subQ in R thigh injection without incident.  Patient observed for 15 minutes post injection.  No bleeding, pain or swelling at site.      Discharge Plan:   [facility]  Patient will return 7/27 for next appointment.   Patient discharged in stable condition accompanied by: self.  Departure Mode: Ambulatory.      Constance Maynard RN  
Statement Selected

## 2023-08-04 NOTE — ED PROVIDER NOTE - ATTENDING CONTRIBUTION TO CARE
Alert-The patient is alert, awake and responds to voice. The patient is oriented to time, place, and person. The triage nurse is able to obtain subjective information.
31y F with no PMH A2 presenting for vaginal bleeding. Pt is 10 weeks pregnant. She is seen by [facility] in [place]. Had ultrasound 4 days ago with no heart beat. Started bleeding this morning around 5a which then started to get heavy about 2 hours prior to arrival. going to 3 pads per hour. LMP was . mild abd cramping, no pain. feeling generalized weakness  AP - likely spontaneous . bleeding on exam. hemodynamically stable. check H/H. obgyn consult reassess

## 2025-03-27 NOTE — PATIENT PROFILE OB - SURGICAL SITE INCISION
[place] Falls Screening   Patient screens Positive for [place] fall assessment screening if any of the following criteria are met:   Presented to the ED because of falls Syncope; Seizure; Loss of consciousness None of the above    Age >70 y.o     Altered Mental Status (AMS); Intoxication with alcohol or substance Acute or chronic confusion; Disorientated; Impaired judgement; Poor safety awareness; Inability to follow instruction    Impaired Mobility Ambulates or transfers with assistive devices; Unable to ambulate or transfer; Acute or chronic injuries impairing on mobility    Nurse Judgement Unstable vital signs; Orthostatic hypotension; Medications (Sedatives/Narcotics/Diuretics etc); Weakness; Neurological/Sensory deficits; Dizziness/Vertigo; Bowel/Bladder incontinence; Diarrhea; Urinary frequency    Screening Results: Negative   If positive screening, select interventions implemented: Environmental Checks Carried Out  Standard Precautions in Place        skin re-assessment no